# Patient Record
Sex: FEMALE | Race: WHITE | NOT HISPANIC OR LATINO | Employment: FULL TIME | URBAN - METROPOLITAN AREA
[De-identification: names, ages, dates, MRNs, and addresses within clinical notes are randomized per-mention and may not be internally consistent; named-entity substitution may affect disease eponyms.]

---

## 2017-01-16 ENCOUNTER — OFFICE VISIT (OUTPATIENT)
Dept: INTERNAL MEDICINE | Facility: CLINIC | Age: 47
End: 2017-01-16
Payer: COMMERCIAL

## 2017-01-16 VITALS
BODY MASS INDEX: 30.24 KG/M2 | HEIGHT: 70 IN | OXYGEN SATURATION: 99 % | HEART RATE: 75 BPM | TEMPERATURE: 97.8 F | DIASTOLIC BLOOD PRESSURE: 64 MMHG | WEIGHT: 211.2 LBS | SYSTOLIC BLOOD PRESSURE: 100 MMHG

## 2017-01-16 DIAGNOSIS — R10.84 ABDOMINAL PAIN, GENERALIZED: Primary | ICD-10-CM

## 2017-01-16 PROCEDURE — 99213 OFFICE O/P EST LOW 20 MIN: CPT | Performed by: INTERNAL MEDICINE

## 2017-01-16 RX ORDER — HYDROCODONE BITARTRATE AND ACETAMINOPHEN 7.5; 325 MG/1; MG/1
1 TABLET ORAL 3 TIMES DAILY
COMMUNITY
End: 2019-07-23

## 2017-01-16 RX ORDER — PREGABALIN 50 MG/1
150 CAPSULE ORAL 2 TIMES DAILY
COMMUNITY
Start: 2016-11-29 | End: 2017-09-21

## 2017-01-16 RX ORDER — DICYCLOMINE HCL 20 MG
20 TABLET ORAL 4 TIMES DAILY PRN
Qty: 40 TABLET | Refills: 1 | Status: SHIPPED | OUTPATIENT
Start: 2017-01-16 | End: 2017-04-20

## 2017-01-16 NOTE — NURSING NOTE
"Chief Complaint   Patient presents with     Abdominal Pain     Right side middle        Initial /64 mmHg  Pulse 75  Temp(Src) 97.8  F (36.6  C) (Oral)  Ht 5' 10\" (1.778 m)  Wt 211 lb 3.2 oz (95.8 kg)  BMI 30.30 kg/m2  SpO2 99% Estimated body mass index is 30.3 kg/(m^2) as calculated from the following:    Height as of this encounter: 5' 10\" (1.778 m).    Weight as of this encounter: 211 lb 3.2 oz (95.8 kg).  BP completed using cuff size: americo Escobar CMA    "

## 2017-01-16 NOTE — MR AVS SNAPSHOT
"              After Visit Summary   1/16/2017    Omaira Smith    MRN: [id number]           Patient Information     Date Of Birth          1970        Visit Information        Provider Department      1/16/2017 1:50 PM Autumn Fine MD [facility]        Today's Diagnoses     Abdominal pain, generalized    -  1       Care Instructions    Ideally, you should complete a CT scan to check for the possibility of appendicitis or another serious GI infection.  If the CT scan comes back as within normal limits, we would then advise you to treat your symptoms as a viral infectious gastroenteritis (or \"the stomach flu\"), keep the diet below and in addition to your usual pain medications, you may try taking   You have indicated that you would like to hold off on the CT scan due to finances. This is medically reasonable (though not optimal) and we will initiate treatment for the stomach flu, as noted above and further explained below.  In addition to your usual Norco, you can take an additional 2 grams of acetaminophen per day.  You may use Bentyl as needed for abdominal pain as well as per prescription bottle instructions.   You may continue to try to use heat or ice packs as needed.  return to clinic if you are not better in 2 weeks.  Please go to the Emergency room if you develop severe abdominal pain, a fever of 100.4F or above, or the inability to tolerate fluids, worsening nausea/vomiting or any blood in your urine or stool.      [place] Diet  Your healthcare provider may recommend a bland diet if you have an upset stomach. It consists of foods that are mild and easy to digest. It is better to eat small frequent meals rather than 3 large meals a day.    Beverages  OK: Fruit juices, non-caffeinated teas and coffee, non-carbonated oakley  Avoid: Carbonated beverage, caffeinated tea and coffee, all alcoholic beverages  Bread  OK: Refined white, wheat or rye bread, mario or soda crackers, " "[place] toast, plain rolls, bagels  Avoid: Whole-grain bread  Cereal  OK: Refined cereals: cooked or ready to eat  Avoid: Whole-grain cereals and granola, or those containing bran, seeds or nuts  Desserts  OK: Peanut butter and all others except those to \"avoid\"  Avoid: Chocolate, cocoa, coconut, popcorn, nuts, seeds, jam, marmalade  Fruits  OK: Canned, cooked, frozen or fresh fruits without seeds or tough skin  Avoid: Olives, skin and seeds of fruit  Meats  OK: All fresh or preserved meat, fish and fowl  Avoid: Any that are prepared with those spices to \"avoid\"  Cheese and eggs  OK: Eggs, cottage cheese, cream cheese, other cheeses  Avoid: All cheeses made with those spices to \"avoid\"  Potatoes and pasta  OK: Potato, rice, macaroni, noodles, spaghetti  Avoid: None  Soups  OK: All soups without heavy seasoning  Avoid: Soups made with those spices to \"avoid\"  Vegetables  OK: Canned, cooked, fresh or frozen mildly flavored vegetables without seeds, skins or coarse fiber  Avoid: Vegetables prepared with those spices to \"avoid\"; skin and seeds of vegetables and those with coarse fiber  Spices  OK: Salt, lemon and lime juice, vinegar, all extracts, tommy, cinnamon, thyme, mace, allspice, paprika  Avoid: Chili powder, cloves, pepper, seed spices, garlic, gravy pickles, highly seasoned salad dressings    7600-7137 [employer]. [address] [place] [address]. All rights reserved. This information is not intended as a substitute for professional medical care. Always follow your healthcare professional's instructions.              Follow-ups after your visit        Who to contact     If you have questions or need follow up information about today's clinic visit or your schedule please contact [facility] directly at [phone].  Normal or non-critical lab and imaging results will be communicated to you by MyChart, letter or phone within 4 business days after the clinic has received the " "results. If you do not hear from us within 7 days, please contact the clinic through [employer] or phone. If you have a critical or abnormal lab result, we will notify you by phone as soon as possible.  Submit refill requests through [employer] or call your pharmacy and they will forward the refill request to us. Please allow 3 business days for your refill to be completed.          Additional Information About Your Visit        [employer] Information     [employer] gives you secure access to your electronic health record. If you see a primary care provider, you can also send messages to your care team and make appointments. If you have questions, please call your primary care clinic.  If you do not have a primary care provider, please call [phone] and they will assist you.        Care EveryWhere ID     This is your Care EveryWhere ID. This could be used by other organizations to access your [facility] medical records  [id number]        Your Vitals Were     Pulse Temperature Height BMI (Body Mass Index) Pulse Oximetry       75 97.8  F (36.6  C) (Oral) 5' 10\" (1.778 m) 30.30 kg/m2 99%        Blood Pressure from Last 3 Encounters:   01/16/17 100/64   07/13/16 139/84   03/09/16 132/84    Weight from Last 3 Encounters:   01/16/17 211 lb 3.2 oz (95.8 kg)   07/13/16 216 lb 9.6 oz (98.249 kg)   03/09/16 208 lb (94.348 kg)              Today, you had the following     No orders found for display         Today's Medication Changes          These changes are accurate as of: 1/16/17  3:06 PM.  If you have any questions, ask your nurse or doctor.               Start taking these medicines.        Dose/Directions    dicyclomine 20 MG tablet   Commonly known as:  BENTYL   Used for:  Abdominal pain, generalized   Started by:  Autumn Fine MD        Dose:  20 mg   Take 1 tablet (20 mg) by mouth 4 times daily as needed   Quantity:  40 tablet   Refills:  1         These medicines have changed or have updated prescriptions.  "       Dose/Directions    HYDROcodone-acetaminophen 7.5-325 MG per tablet   Commonly known as:  NORCO   This may have changed:  Another medication with the same name was removed. Continue taking this medication, and follow the directions you see here.   Changed by:  Autumn Fine MD        Dose:  1 tablet   Take 1 tablet by mouth 3 times daily   Refills:  0            Where to get your medicines      These medications were sent to [id number] - [place] - [facility] [address] AT [address] [place], [place] [address]     Phone:  [phone]    - dicyclomine 20 MG tablet             Primary Care Provider Office Phone # Fax #    Jt Hollis PA-C [phone]       [facility] [address] [facility] [address]        Thank you!     Thank you for choosing [facility]  for your care. Our goal is always to provide you with excellent care. Hearing back from our patients is one way we can continue to improve our services. Please take a few minutes to complete the written survey that you may receive in the mail after your visit with us. Thank you!             Your Updated Medication List - Protect others around you: Learn how to safely use, store and throw away your medicines at www.disposemymeds.org.          This list is accurate as of: 1/16/17  3:06 PM.  Always use your most recent med list.                   Brand Name Dispense Instructions for use    dicyclomine 20 MG tablet    BENTYL    40 tablet    Take 1 tablet (20 mg) by mouth 4 times daily as needed       HYDROcodone-acetaminophen 7.5-325 MG per tablet    NORCO     Take 1 tablet by mouth 3 times daily       levonorgestrel-ethinyl estradiol 0.1-20 MG-MCG per tablet    AVIANE,ALESSE,LESSINA    84 tablet    Take 1 tablet by mouth daily       LYRICA 50 MG capsule   Generic drug:  pregabalin      150 mg 2 times daily       PAXIL 10 MG tablet    Generic drug:  PARoxetine      Take 10 mg by mouth every morning.       TYLENOL PO      Take  by mouth 3 times daily.

## 2017-01-16 NOTE — PATIENT INSTRUCTIONS
"Ideally, you should complete a CT scan to check for the possibility of appendicitis or another serious GI infection.  If the CT scan comes back as within normal limits, we would then advise you to treat your symptoms as a viral infectious gastroenteritis (or \"the stomach flu\"), keep the diet below and in addition to your usual pain medications, you may try taking   You have indicated that you would like to hold off on the CT scan due to finances. This is medically reasonable (though not optimal) and we will initiate treatment for the stomach flu, as noted above and further explained below.  In addition to your usual Norco, you can take an additional 2 grams of acetaminophen per day.  You may use Bentyl as needed for abdominal pain as well as per prescription bottle instructions.   You may continue to try to use heat or ice packs as needed.  return to clinic if you are not better in 2 weeks.  Please go to the Emergency room if you develop severe abdominal pain, a fever of 100.4F or above, or the inability to tolerate fluids, worsening nausea/vomiting or any blood in your urine or stool.      [place] Diet  Your healthcare provider may recommend a bland diet if you have an upset stomach. It consists of foods that are mild and easy to digest. It is better to eat small frequent meals rather than 3 large meals a day.    Beverages  OK: Fruit juices, non-caffeinated teas and coffee, non-carbonated oakley  Avoid: Carbonated beverage, caffeinated tea and coffee, all alcoholic beverages  Bread  OK: Refined white, wheat or rye bread, mario or soda crackers, [place] toast, plain rolls, bagels  Avoid: Whole-grain bread  Cereal  OK: Refined cereals: cooked or ready to eat  Avoid: Whole-grain cereals and granola, or those containing bran, seeds or nuts  Desserts  OK: Peanut butter and all others except those to \"avoid\"  Avoid: Chocolate, cocoa, coconut, popcorn, nuts, seeds, jam, marmalade  Fruits  OK: Canned, cooked, frozen or fresh " "fruits without seeds or tough skin  Avoid: Olives, skin and seeds of fruit  Meats  OK: All fresh or preserved meat, fish and fowl  Avoid: Any that are prepared with those spices to \"avoid\"  Cheese and eggs  OK: Eggs, cottage cheese, cream cheese, other cheeses  Avoid: All cheeses made with those spices to \"avoid\"  Potatoes and pasta  OK: Potato, rice, macaroni, noodles, spaghetti  Avoid: None  Soups  OK: All soups without heavy seasoning  Avoid: Soups made with those spices to \"avoid\"  Vegetables  OK: Canned, cooked, fresh or frozen mildly flavored vegetables without seeds, skins or coarse fiber  Avoid: Vegetables prepared with those spices to \"avoid\"; skin and seeds of vegetables and those with coarse fiber  Spices  OK: Salt, lemon and lime juice, vinegar, all extracts, tommy, cinnamon, thyme, mace, allspice, paprika  Avoid: Chili powder, cloves, pepper, seed spices, garlic, gravy pickles, highly seasoned salad dressings    9442-1762 [employer]. [address] [place] [address]. All rights reserved. This information is not intended as a substitute for professional medical care. Always follow your healthcare professional's instructions.        "

## 2017-01-16 NOTE — PROGRESS NOTES
SUBJECTIVE:                                                    Omaira Smith is a 46 year old female who presents to clinic today for the following health issues:      Abdominal Pain      Duration: 3 days     Description (location/character/radiation): started on left side moved to right. Sharp pain comes and goes. Constant dull pain stays       Associated flank pain: starting to move around to back area    Intensity:  severe    Accompanying signs and symptoms:        Fever/Chills: no        Gas/Bloating: YES  Heartburn: no       Nausea/vomiting: YES to nausea.         Diarrhea: YES. In the last 24h, she has had diarrhea a few times (earlier in the course she had it 10 times a day).       Dysuria or Hematuria: no     History (previous similar pain/trauma/previous testing): none    Precipitating or alleviating factors:       Pain worse with eating/BM/urination: eating seems to make it worse, or the pain is the same from food, but the nausea comes on.       Pain relieved by BM: no     Therapies tried and outcome: heat-without an affect.     LMP:  1/6/2016    No recent travel or sick contacts and no recent abx. No new medications recently.     Patient had diarrhea for most of Saturday and then got a sharp pain on the left side, lasted an hour and subsided; then pain started on the right side of the abd and has continued since.  She is on chronic opioids, but no changes in how she takes it (thus not likely to be opioid withdrawal).   Prior to the start of these symptoms, she had BMs twice a day. The color of the stools has been the same.  Food intake yesterday: soda crackers and chicken broth soup yesterday and this brought back the pain. Eating toast today, made the pain worse and made her nauseous and she may have had diarrhea afterwards.   She has a history of cholecystectomy 10 yrs ago.   Fluids: yesterday: about 48 oz of fluids.  Has never had a colonoscopy.  No recent abd CTs.      Problem list and histories  "reviewed & adjusted, as indicated.  Additional history: as documented    Patient Active Problem List   Diagnosis     Migraines     RSD (reflex sympathetic dystrophy)     CARDIOVASCULAR SCREENING; LDL GOAL LESS THAN 160     Ankle pain     Cervical high risk HPV (human papillomavirus) test positive     JESUS (generalized anxiety disorder)     Panic attack     Past Surgical History   Procedure Laterality Date     Orthopedic surgery       Lt leg 5X     Cholecystectomy       Eye surgery  age 5     Rt lazy eye repair     Eye surgery  age 15     Rt eye     Ent surgery  age 15     tonsilectomy       Social History   Substance Use Topics     Smoking status: Former Smoker     Smokeless tobacco: Never Used     Alcohol Use: Yes      Comment: occ     Family History   Problem Relation Age of Onset     Family history unknown: Yes         Current Outpatient Prescriptions   Medication Sig Dispense Refill     HYDROcodone-acetaminophen (NORCO) 7.5-325 MG per tablet Take 1 tablet by mouth 3 times daily       pregabalin (LYRICA) 50 MG capsule 150 mg 2 times daily        dicyclomine (BENTYL) 20 MG tablet Take 1 tablet (20 mg) by mouth 4 times daily as needed 40 tablet 1     levonorgestrel-ethinyl estradiol (AVIANE,ALESSE,LESSINA) 0.1-20 MG-MCG per tablet Take 1 tablet by mouth daily 84 tablet 4     Acetaminophen (TYLENOL PO) Take  by mouth 3 times daily.       paroxetine (PAXIL) 10 MG tablet Take 10 mg by mouth every morning.       ==============================================================  ROS:  Constitutional, HEENT, cardiovascular, pulmonary, GI, , musculoskeletal, neuro, skin, endocrine and psych systems are negative, except as otherwise noted.       OBJECTIVE:                                                    /64 mmHg  Pulse 75  Temp(Src) 97.8  F (36.6  C) (Oral)  Ht 5' 10\" (1.778 m)  Wt 211 lb 3.2 oz (95.8 kg)  BMI 30.30 kg/m2  SpO2 99%  Body mass index is 30.3 kg/(m^2).     GENERAL APPEARANCE: healthy, alert and " "in no distress  EYES: Eyes grossly normal to inspection, and conjunctivae and sclerae normal  HENT: head normocephalic and atraumatic and mouth without ulcers or lesions, oropharynx clear and oral mucous membranes moist  NECK: no noticeable adenopathy, no asymmetry, masses, or scars   RESP: lungs clear to auscultation - no rales, rhonchi or wheezes  CV: regular rate and rhythm, normal S1 S2, no S3 or S4, no murmur, click or rub, no peripheral edema and peripheral pulses strong  ABDOMEN:  Generalized tenderness to palpation without peritoneal signs  O/w, soft, nontender, no hepatosplenomegaly, no masses and bowel sounds normal  MS: no musculoskeletal defects are noted and gait is age appropriate without ataxia  SKIN: no suspicious lesions or rashes  NEURO: mentation intact and speech normal  PSYCH: mentation appears normal and affect normal/bright.     ASSESSMENT/PLAN:                                                        ICD-10-CM    1. Abdominal pain, generalized R10.84 dicyclomine (BENTYL) 20 MG tablet      ASSESSMENT: most c/w viral infectious gastroenteritis.  PLAN:  As per orders above and patient instructions below.      Patient Instructions   Ideally, you should complete a CT scan to check for the possibility of appendicitis or another serious GI infection.  If the CT scan comes back as within normal limits, we would then advise you to treat your symptoms as a viral infectious gastroenteritis (or \"the stomach flu\"), keep the diet below and in addition to your usual pain medications, you may try taking   You have indicated that you would like to hold off on the CT scan due to finances. This is medically reasonable (though not optimal) and we will initiate treatment for the stomach flu, as noted above and further explained below.  In addition to your usual Norco, you can take an additional 2 grams of acetaminophen per day.  You may use Bentyl as needed for abdominal pain as well as per prescription bottle " "instructions.   You may continue to try to use heat or ice packs as needed.  return to clinic if you are not better in 2 weeks.  Please go to the Emergency room if you develop severe abdominal pain, a fever of 100.4F or above, or the inability to tolerate fluids, worsening nausea/vomiting or any blood in your urine or stool.      [place] Diet  Your healthcare provider may recommend a bland diet if you have an upset stomach. It consists of foods that are mild and easy to digest. It is better to eat small frequent meals rather than 3 large meals a day.    Beverages  OK: Fruit juices, non-caffeinated teas and coffee, non-carbonated oakley  Avoid: Carbonated beverage, caffeinated tea and coffee, all alcoholic beverages  Bread  OK: Refined white, wheat or rye bread, mario or soda crackers, [place] toast, plain rolls, bagels  Avoid: Whole-grain bread  Cereal  OK: Refined cereals: cooked or ready to eat  Avoid: Whole-grain cereals and granola, or those containing bran, seeds or nuts  Desserts  OK: Peanut butter and all others except those to \"avoid\"  Avoid: Chocolate, cocoa, coconut, popcorn, nuts, seeds, jam, marmalade  Fruits  OK: Canned, cooked, frozen or fresh fruits without seeds or tough skin  Avoid: Olives, skin and seeds of fruit  Meats  OK: All fresh or preserved meat, fish and fowl  Avoid: Any that are prepared with those spices to \"avoid\"  Cheese and eggs  OK: Eggs, cottage cheese, cream cheese, other cheeses  Avoid: All cheeses made with those spices to \"avoid\"  Potatoes and pasta  OK: Potato, rice, macaroni, noodles, spaghetti  Avoid: None  Soups  OK: All soups without heavy seasoning  Avoid: Soups made with those spices to \"avoid\"  Vegetables  OK: Canned, cooked, fresh or frozen mildly flavored vegetables without seeds, skins or coarse fiber  Avoid: Vegetables prepared with those spices to \"avoid\"; skin and seeds of vegetables and those with coarse fiber  Spices  OK: Salt, lemon and lime juice, vinegar, all " extracts, tommy, cinnamon, thyme, mace, allspice, paprika  Avoid: Chili powder, cloves, pepper, seed spices, garlic, gravy pickles, highly seasoned salad dressings    [id number] [employer]. [address] [place] [address]. All rights reserved. This information is not intended as a substitute for professional medical care. Always follow your healthcare professional's instructions.                          Autumn Fine MD  [facility]

## 2017-04-20 ENCOUNTER — OFFICE VISIT (OUTPATIENT)
Dept: FAMILY MEDICINE | Facility: CLINIC | Age: 47
End: 2017-04-20
Payer: OTHER MISCELLANEOUS

## 2017-04-20 VITALS
OXYGEN SATURATION: 98 % | HEIGHT: 70 IN | DIASTOLIC BLOOD PRESSURE: 75 MMHG | WEIGHT: 216 LBS | SYSTOLIC BLOOD PRESSURE: 125 MMHG | BODY MASS INDEX: 30.92 KG/M2 | HEART RATE: 81 BPM

## 2017-04-20 DIAGNOSIS — G90.50 RSD (REFLEX SYMPATHETIC DYSTROPHY): ICD-10-CM

## 2017-04-20 DIAGNOSIS — Z01.818 PREOP GENERAL PHYSICAL EXAM: Primary | ICD-10-CM

## 2017-04-20 LAB
BASOPHILS # BLD AUTO: 0.1 10E9/L (ref 0–0.2)
BASOPHILS NFR BLD AUTO: 0.6 %
DIFFERENTIAL METHOD BLD: NORMAL
EOSINOPHIL # BLD AUTO: 0.4 10E9/L (ref 0–0.7)
EOSINOPHIL NFR BLD AUTO: 4.8 %
ERYTHROCYTE [DISTWIDTH] IN BLOOD BY AUTOMATED COUNT: 12.9 % (ref 10–15)
HCT VFR BLD AUTO: 38.4 % (ref 35–47)
HGB BLD-MCNC: 12.7 G/DL (ref 11.7–15.7)
LYMPHOCYTES # BLD AUTO: 3.8 10E9/L (ref 0.8–5.3)
LYMPHOCYTES NFR BLD AUTO: 43.1 %
MCH RBC QN AUTO: 30.2 PG (ref 26.5–33)
MCHC RBC AUTO-ENTMCNC: 33.1 G/DL (ref 31.5–36.5)
MCV RBC AUTO: 91 FL (ref 78–100)
MONOCYTES # BLD AUTO: 0.6 10E9/L (ref 0–1.3)
MONOCYTES NFR BLD AUTO: 6.4 %
NEUTROPHILS # BLD AUTO: 4 10E9/L (ref 1.6–8.3)
NEUTROPHILS NFR BLD AUTO: 45.1 %
PLATELET # BLD AUTO: 216 10E9/L (ref 150–450)
RBC # BLD AUTO: 4.21 10E12/L (ref 3.8–5.2)
WBC # BLD AUTO: 8.8 10E9/L (ref 4–11)

## 2017-04-20 PROCEDURE — 85025 COMPLETE CBC W/AUTO DIFF WBC: CPT | Performed by: PHYSICIAN ASSISTANT

## 2017-04-20 PROCEDURE — 80048 BASIC METABOLIC PNL TOTAL CA: CPT | Performed by: PHYSICIAN ASSISTANT

## 2017-04-20 PROCEDURE — 99214 OFFICE O/P EST MOD 30 MIN: CPT | Performed by: PHYSICIAN ASSISTANT

## 2017-04-20 PROCEDURE — 36415 COLL VENOUS BLD VENIPUNCTURE: CPT | Performed by: PHYSICIAN ASSISTANT

## 2017-04-20 NOTE — PROGRESS NOTES
[facility]  [address] [facility] [address]  [phone]  Dept: [phone]    PRE-OP EVALUATION:  Today's date: 2017    Omaira Smith (: 1970) presents for pre-operative evaluation assessment as requested by Dr. Baker.  She requires evaluation and anesthesia risk assessment prior to undergoing surgery/procedure for treatment of RSD .  Proposed procedure: SCS implant    Date of Surgery/ Procedure: 17  Time of Surgery/ Procedure: 7:00 am   Hospital/Surgical Facility: [facility]  Fax number for surgical facility: [phone]  Primary Physician: Jt Hollis  Type of Anesthesia Anticipated: General    Patient has a Health Care Directive or Living Will:  YES    Preop Questions 2017   1.  Do you have a history of heart attack, stroke, stent, bypass or surgery on an artery in the head, neck, heart or legs? No   2.  Do you ever have any pain or discomfort in your chest? No   3.  Do you have a history of  Heart Failure? No   4.   Are you troubled by shortness of breath when:  walking on a level surface, or up a slight hill, or at night? No   5.  Do you currently have a cold, bronchitis or other respiratory infection? No   6.  Do you have a cough, shortness of breath, or wheezing? No   7.  Do you sometimes get pains in the calves of your legs when you walk? No   8. Do you or anyone in your family have previous history of blood clots? No   9.  Do you or does anyone in your family have a serious bleeding problem such as prolonged bleeding following surgeries or cuts? No   10. Have you ever had problems with anemia or been told to take iron pills? No   11. Have you had any abnormal blood loss such as black, tarry or bloody stools, or abnormal vaginal bleeding? No   12. Have you ever had a blood transfusion? No   13. Have you or any of your relatives ever had problems with anesthesia? No   14. Do you have sleep apnea, excessive snoring or daytime drowsiness? No    15. Do you have any prosthetic heart valves? No   16. Do you have prosthetic joints? No   17. Is there any chance that you may be pregnant? No         HPI:                                                      Brief HPI related to upcoming procedure: 14 yrs ago tib/fib fracture with multiple surgeries. History of RDS      See problem list for active medical problems.  Problems all longstanding and stable, except as noted/documented.  See ROS for pertinent symptoms related to these conditions.                                                                                                  .    MEDICAL HISTORY:                                                      Patient Active Problem List    Diagnosis Date Noted     JESUS (generalized anxiety disorder) 12/07/2016     Priority: Medium     Panic attack 12/07/2016     Priority: Medium     Cervical high risk HPV (human papillomavirus) test positive 03/06/2015     Priority: Medium     3/6/15: Pap - NIL, + HR HPV 18. Plan colp  4/28/15: [place] - normal with no visible lesions. No biopsies taken. Plan cotest in 1 year.   3/9/16: pap - NIL, + HR HPV 18. Plan colp  7/13/16 [place]: Your recent pathology report was negative for abnormal cells so no treatment is advised at this time. Plan: cotest in 1 year.        Ankle pain 06/11/2014     Priority: Medium     CARDIOVASCULAR SCREENING; LDL GOAL LESS THAN 160 07/11/2012     Priority: Medium     Migraines      Priority: Medium     RSD (reflex sympathetic dystrophy)      Priority: Medium      Past Medical History:   Diagnosis Date     Anxiety      Cervical high risk HPV (human papillomavirus) test positive 3/2015, 3/2016    type 18     Migraines      RSD (reflex sympathetic dystrophy)      Past Surgical History:   Procedure Laterality Date     CHOLECYSTECTOMY       ENT SURGERY  age 15    tonsilectomy     EYE SURGERY  age 5    Rt lazy eye repair     EYE SURGERY  age 15    Rt eye     ORTHOPEDIC SURGERY      Lt leg 5X     Current  "Outpatient Prescriptions   Medication Sig Dispense Refill     HYDROcodone-acetaminophen (NORCO) 7.5-325 MG per tablet Take 1 tablet by mouth 3 times daily       pregabalin (LYRICA) 50 MG capsule 150 mg 2 times daily        levonorgestrel-ethinyl estradiol (AVIANE,ALESSE,LESSINA) 0.1-20 MG-MCG per tablet Take 1 tablet by mouth daily 84 tablet 4     Acetaminophen (TYLENOL PO) Take  by mouth 3 times daily.       paroxetine (PAXIL) 10 MG tablet Take 10 mg by mouth every morning.       OTC products: None, except as noted above    Allergies   Allergen Reactions     Gabapentin Other (See Comments)     Irritability     Ibuprofen Other (See Comments)     LW Reaction: stomach pain      Latex Allergy: NO    Social History   Substance Use Topics     Smoking status: Former Smoker     Smokeless tobacco: Never Used     Alcohol use Yes      Comment: occ     History   Drug Use No       REVIEW OF SYSTEMS:                                                    C: NEGATIVE for fever, chills, change in weight  I: NEGATIVE for worrisome rashes, moles or lesions  E: NEGATIVE for vision changes or irritation  E/M: NEGATIVE for ear, mouth and throat problems  R: NEGATIVE for significant cough or SOB  B: NEGATIVE for masses, tenderness or discharge  CV: NEGATIVE for chest pain, palpitations or peripheral edema  GI: NEGATIVE for nausea, abdominal pain, heartburn, or change in bowel habits  : NEGATIVE for frequency, dysuria, or hematuria  M: NEGATIVE for significant arthralgias or myalgia  N: NEGATIVE for weakness, dizziness or paresthesias  E: NEGATIVE for temperature intolerance, skin/hair changes  H: NEGATIVE for bleeding problems  P: NEGATIVE for changes in mood or affect    EXAM:                                                    /75  Pulse 81  Ht 5' 10\" (1.778 m)  Wt 216 lb (98 kg)  SpO2 98%  BMI 30.99 kg/m2    GENERAL APPEARANCE: healthy, alert and no distress     EYES: EOMI, PERRL     HENT: ear canals and TM's normal and nose " and mouth without ulcers or lesions     NECK: no adenopathy, no asymmetry, masses, or scars and thyroid normal to palpation     RESP: lungs clear to auscultation - no rales, rhonchi or wheezes     CV: regular rates and rhythm, normal S1 S2, no S3 or S4 and no murmur, click or rub     ABDOMEN:  soft, nontender, no HSM or masses and bowel sounds normal     PSYCH: mentation appears normal. and affect normal/bright     LYMPHATICS: No axillary, cervical, or supraclavicular nodes    DIAGNOSTICS:                                                      Labs Drawn and in Process:   Unresulted Labs Ordered in the Past 30 Days of this Admission     No orders found from 2/19/2017 to 4/21/2017.          Recent Labs   Lab Test  02/27/15   0721  06/11/14   1736  06/20/12   1722   HGB  13.3  12.9  12.3   PLT  225   --   232   NA  140  138  141   POTASSIUM  4.5  4.2  4.0   CR  0.70  0.72  0.57        IMPRESSION:                                                    Reason for surgery/procedure:  treatment of RSD .  Proposed procedure: SCS implant      The proposed surgical procedure is considered INTERMEDIATE risk.    REVISED CARDIAC RISK INDEX  The patient has the following serious cardiovascular risks for perioperative complications such as (MI, PE, VFib and 3  AV Block):  No serious cardiac risks  INTERPRETATION: 0 risks: Class I (very low risk - 0.4% complication rate)    The patient has the following additional risks for perioperative complications:  No identified additional risks      ICD-10-CM    1. Preop general physical exam Z01.818 Basic metabolic panel     CBC with platelets differential   2. RSD (reflex sympathetic dystrophy) G90.50        RECOMMENDATIONS:                                                        --Patient is to take all scheduled medications on the day of surgery EXCEPT for modifications listed below.    APPROVAL GIVEN to proceed with proposed procedure, without further diagnostic evaluation       Signed  Electronically by: Jt Hollis PA-C  Chart reviewed, agree with evaluation and recommendations above.  Daksha Gómez M.D.       Copy of this evaluation report is provided to requesting physician.    [place] Preop Guidelines

## 2017-04-20 NOTE — MR AVS SNAPSHOT
After Visit Summary   4/20/2017    Omaira Smith    MRN: [id number]           Patient Information     Date Of Birth          1970        Visit Information        Provider Department      4/20/2017 5:10 PM Jt Hollis PA-C [facility]        Today's Diagnoses     Preop general physical exam    -  1    RSD (reflex sympathetic dystrophy)          Care Instructions      Before Your Surgery      Call your surgeon if there is any change in your health. This includes signs of a cold or flu (such as a sore throat, runny nose, cough, rash or fever).    Do not smoke, drink alcohol or take over the counter medicine (unless your surgeon or primary care doctor tells you to) for the 24 hours before and after surgery.    If you take prescribed drugs: Follow your doctor s orders about which medicines to take and which to stop until after surgery.    Eating and drinking prior to surgery: follow the instructions from your surgeon    Take a shower or bath the night before surgery. Use the soap your surgeon gave you to gently clean your skin. If you do not have soap from your surgeon, use your regular soap. Do not shave or scrub the surgery site.  Wear clean pajamas and have clean sheets on your bed.         Follow-ups after your visit        Who to contact     If you have questions or need follow up information about today's clinic visit or your schedule please contact [facility] directly at [phone].  Normal or non-critical lab and imaging results will be communicated to you by MyChart, letter or phone within 4 business days after the clinic has received the results. If you do not hear from us within 7 days, please contact the clinic through MyChart or phone. If you have a critical or abnormal lab result, we will notify you by phone as soon as possible.  Submit refill requests through [employer] [address] or call your pharmacy and they will forward the refill request to us. Please  "allow 3 business days for your refill to be completed.          Additional Information About Your Visit        MyChart Information     [employer] gives you secure access to your electronic health record. If you see a primary care provider, you can also send messages to your care team and make appointments. If you have questions, please call your primary care clinic.  If you do not have a primary care provider, please call [phone] and they will assist you.        Care EveryWhere ID     This is your Care EveryWhere ID. This could be used by other organizations to access your [facility] medical records  [id number]        Your Vitals Were     Pulse Height Pulse Oximetry BMI (Body Mass Index)          81 5' 10\" (1.778 m) 98% 30.99 kg/m2         Blood Pressure from Last 3 Encounters:   04/20/17 125/75   01/16/17 100/64   07/13/16 139/84    Weight from Last 3 Encounters:   04/20/17 216 lb (98 kg)   01/16/17 211 lb 3.2 oz (95.8 kg)   07/13/16 216 lb 9.6 oz (98.2 kg)              We Performed the Following     Basic metabolic panel     CBC with platelets differential        Primary Care Provider Office Phone # Fax #    Jt Hollis PA-C [phone]       [facility] [address] [facility] [address]        Thank you!     Thank you for choosing [facility]  for your care. Our goal is always to provide you with excellent care. Hearing back from our patients is one way we can continue to improve our services. Please take a few minutes to complete the written survey that you may receive in the mail after your visit with us. Thank you!             Your Updated Medication List - Protect others around you: Learn how to safely use, store and throw away your medicines at www.disposemymeds.org.          This list is accurate as of: 4/20/17  5:35 PM.  Always use your most recent med list.                   Brand Name Dispense Instructions for use    HYDROcodone-acetaminophen 7.5-325 " MG per tablet    NORCO     Take 1 tablet by mouth 3 times daily       levonorgestrel-ethinyl estradiol 0.1-20 MG-MCG per tablet    AVIANE,ALESSE,LESSINA    84 tablet    Take 1 tablet by mouth daily       LYRICA 50 MG capsule   Generic drug:  pregabalin      150 mg 2 times daily       PAXIL 10 MG tablet   Generic drug:  PARoxetine      Take 10 mg by mouth every morning.       TYLENOL PO      Take  by mouth 3 times daily.

## 2017-04-20 NOTE — NURSING NOTE
"Chief Complaint   Patient presents with     Pre-Op Exam       Initial /75  Pulse 81  Ht 5' 10\" (1.778 m)  Wt 216 lb (98 kg)  SpO2 98%  BMI 30.99 kg/m2 Estimated body mass index is 30.99 kg/(m^2) as calculated from the following:    Height as of this encounter: 5' 10\" (1.778 m).    Weight as of this encounter: 216 lb (98 kg).  Medication Reconciliation: complete  Rochelle Acevedo CMA    "

## 2017-04-21 LAB
ANION GAP SERPL CALCULATED.3IONS-SCNC: 6 MMOL/L (ref 3–14)
BUN SERPL-MCNC: 14 MG/DL (ref 7–30)
CALCIUM SERPL-MCNC: 9 MG/DL (ref 8.5–10.1)
CHLORIDE SERPL-SCNC: 104 MMOL/L (ref 94–109)
CO2 SERPL-SCNC: 29 MMOL/L (ref 20–32)
CREAT SERPL-MCNC: 0.68 MG/DL (ref 0.52–1.04)
GFR SERPL CREATININE-BSD FRML MDRD: ABNORMAL ML/MIN/1.7M2
GLUCOSE SERPL-MCNC: 102 MG/DL (ref 70–99)
POTASSIUM SERPL-SCNC: 4.2 MMOL/L (ref 3.4–5.3)
SODIUM SERPL-SCNC: 139 MMOL/L (ref 133–144)

## 2017-04-21 NOTE — PROGRESS NOTES
Ms. Smith,    All of your labs were normal for you.    Please contact the clinic if you have additional questions.  Thank you.    Sincerely,    Jt Hollis PA-C

## 2017-04-25 ENCOUNTER — TELEPHONE (OUTPATIENT)
Dept: FAMILY MEDICINE | Facility: CLINIC | Age: 47
End: 2017-04-25

## 2017-06-02 DIAGNOSIS — Z30.011 ENCOUNTER FOR INITIAL PRESCRIPTION OF CONTRACEPTIVE PILLS: ICD-10-CM

## 2017-06-02 RX ORDER — LEVONORGESTREL/ETHIN.ESTRADIOL 0.1-0.02MG
TABLET ORAL
Qty: 84 TABLET | Refills: 0 | Status: SHIPPED | OUTPATIENT
Start: 2017-06-02 | End: 2017-08-25

## 2017-06-02 NOTE — TELEPHONE ENCOUNTER
Rx refilled per [facility] RN refill protocol.    TC, patient due for:  AFE and BCP refill     Yasmeen Gale RN

## 2017-06-20 ENCOUNTER — MYC MEDICAL ADVICE (OUTPATIENT)
Dept: FAMILY MEDICINE | Facility: CLINIC | Age: 47
End: 2017-06-20

## 2017-06-20 DIAGNOSIS — F41.1 GAD (GENERALIZED ANXIETY DISORDER): Primary | ICD-10-CM

## 2017-06-21 RX ORDER — PAROXETINE 10 MG/1
10 TABLET, FILM COATED ORAL AT BEDTIME
Qty: 90 TABLET | Refills: 1 | Status: SHIPPED | OUTPATIENT
Start: 2017-06-21 | End: 2017-10-24

## 2017-06-22 ENCOUNTER — OFFICE VISIT (OUTPATIENT)
Dept: FAMILY MEDICINE | Facility: CLINIC | Age: 47
End: 2017-06-22
Payer: OTHER MISCELLANEOUS

## 2017-06-22 VITALS
WEIGHT: 220 LBS | DIASTOLIC BLOOD PRESSURE: 81 MMHG | HEIGHT: 70 IN | BODY MASS INDEX: 31.5 KG/M2 | OXYGEN SATURATION: 100 % | HEART RATE: 82 BPM | SYSTOLIC BLOOD PRESSURE: 128 MMHG

## 2017-06-22 DIAGNOSIS — G90.50 RSD (REFLEX SYMPATHETIC DYSTROPHY): ICD-10-CM

## 2017-06-22 DIAGNOSIS — M72.2 PLANTAR FASCIITIS: Primary | ICD-10-CM

## 2017-06-22 PROCEDURE — 99213 OFFICE O/P EST LOW 20 MIN: CPT | Performed by: PHYSICIAN ASSISTANT

## 2017-06-22 NOTE — MR AVS SNAPSHOT
After Visit Summary   6/22/2017    Omaira Smith    MRN: [id number]           Patient Information     Date Of Birth          1970        Visit Information        Provider Department      6/22/2017 5:10 PM Jt Hollis PA-C [facility]        Today's Diagnoses     Plantar fasciitis    -  1    RSD (reflex sympathetic dystrophy)          Care Instructions                           Plantar Fasciitis   What is plantar fasciitis?   Plantar fasciitis is a painful inflammation of the bottom of the foot between the ball of the foot and the heel.   How does it occur?   There are several possible causes of plantar fasciitis, including:     wearing high heels     gaining weight     increased walking, standing, or stair-climbing   If you wear high-heeled shoes, including western-style boots, for long periods of time, the tough, tendonlike tissue of the bottom of your foot can become shorter. This layer of tissue is called fascia. Pain occurs when you stretch fascia that has shortened. This painful stretching might happen, for example, when you walk barefoot after getting out of bed in the morning.   If you gain weight, you might be more likely to have plantar fasciitis, especially if you walk a lot or  shoes with poor heel cushioning. Normally there is a pad of fatty tissue under your heel bone. Weight gain might break down this fat pad and cause heel pain.   Runners may get plantar fasciitis when they change their workout and increase their mileage or frequency of workouts. It can also occur with a change in exercise surface or terrain, or if your shoes are worn out and don't provide enough cushion for your heels.   If the arches of your foot are abnormally high or low, you are more likely to develop plantar fasciitis than if your arches are normal.   What are the symptoms?   The main symptom of plantar fasciitis is heel pain when you walk. You may also feel pain when you  stand and possibly even when you are resting. This pain typically occurs first thing in the morning after you get out of bed, when your foot is placed flat on the floor. The pain occurs because you are stretching the plantar fascia. The pain usually lessens with more walking, but you may have it again after periods of rest.   You may feel no pain when you are sleeping because the position of your feet during rest allows the fascia to shorten and relax.   How is it diagnosed?   Your healthcare provider will ask about your symptoms. He or she will ask if the bottom of your heel is tender and if you have pain when you stretch the bottom of your foot. An X-ray of your heel may be done.   How is it treated?     Give your painful heel lots of rest. You may need to stay completely off your foot for several days when the pain is severe.     Your healthcare provider may recommend or prescribe anti-inflammatory medicines, such as aspirin or ibuprofen. These drugs decrease pain and inflammation. Adults aged 65 years and older should not take non-steroidal anti-inflammatory medicine for more than 7 days without their healthcare provider's approval. Resting your heel on an ice pack for a few minutes several times a day can also help.     Try to cushion your foot. You can do this by wearing athletic shoes, even at work, for awhile. Heel cushions can also be used. The cushions should be worn in both shoes. They are most helpful if you are overweight or an older adult.     Your provider may recommend special arch supports or inserts for your shoes called orthotics, either custom-made or off the shelf. These supports can be particularly helpful if you have flat feet or high arches.     Your provider may recommend an injection of a cortisone-like medicine.     Lose weight if needed.     A night splint may be recommended. This will keep the plantar fascia stretched while you are sleeping.     Physical therapy for additional treatments  may be recommended     Surgery is rarely needed.   How long will the effects last?   You may find that the pain is sometimes worse and sometimes better over time. If you get treatment soon after you notice the pain, the symptoms should stop after several weeks. If, however, you have had plantar fasciitis for a long time, it may take many weeks to months for the pain to go away.   When can I return to my normal activities?   Everyone recovers from an injury at a different rate. Return to your activities will be determined by how soon your foot recovers, not by how many days or weeks it has been since your injury has occurred. In general, the longer you have symptoms before you start treatment, the longer it will take to get better. The goal of rehabilitation is to return you to your normal activities as soon as is safely possible. If you return too soon you may worsen your injury.   You may safely return to your activities when, starting from the top of the list and progressing to the end, each of the following is true:     You have full range of motion in the injured foot compared with the uninjured foot.     You have full strength of the injured foot compared with the uninjured foot.     You can walk straight ahead without significant pain or limping.   How can I prevent plantar fasciitis?   The best way to prevent plantar fasciitis is to wear shoes that are well made and fit your feet. This is especially important when you exercise or walk a lot or stand for a long time on hard surfaces. Get new athletic shoes before your old shoes stop supporting and cushioning your feet.   You should also:     Avoid repeated jarring to the heel.     Keep a healthy weight.     Do your leg and foot stretching exercises regularly.   Developed by [employer].   Published by [employer].   Last modified: 2008-08-11   Last reviewed: 2008-07-07   This content is reviewed periodically and is subject to change as new health information  becomes available. The information is intended to inform and educate and is not a replacement for medical evaluation, advice, diagnosis or treatment by a healthcare professional.   Sports Medicine Advisor 2009.1 Index  Sports Medicine Advisor 2009.1 Credits     2009 [facility] and/or its affiliates. All Rights Reserved.               Plantar Fasciitis Rehabilitation Exercises   You may begin exercising the muscles of your foot right away by gently stretching them as follows:     Prone hip extension: Lie on your stomach with your legs straight out behind you. Tighten the buttocks and thigh muscles of your injured leg and lift it off the floor about 8 inches. Keep your knee straight. Hold for 5 seconds. Then lower your leg and relax. Do 3 sets of 10.     Towel stretch: Sit on a hard surface with one leg stretched out in front of you. Loop a towel around your toes and the ball of your foot and pull the towel toward your body keeping your knee straight. Hold this position for 15 to 30 seconds then relax. Repeat 3 times.   When the towel stretch becomes too easy, you may begin doing the standing calf stretch.     Standing calf stretch: Facing a wall, put your hands against the wall at about eye level. Keep one leg back with the heel on the floor, and the other leg forward. Turn your back foot slightly inward (as if you were pigeon-toed) as you slowly lean into the wall until you feel a stretch in the back of your calf. Hold for 15 to 30 seconds. Repeat 3 times and then switch the position of your legs and repeat the exercise 3 times. Do this exercise several times each day.     Sitting plantar fascia stretch: Sit in a chair and cross one foot over your other knee. Grab the base of your toes and pull them back toward your leg until you feel a comfortable stretch. Hold 15 seconds and repeat 3 times.   When you can stand comfortably on your injured foot, you can begin standing to stretch the bottom of your foot using the  plantar fascia stretch.     Achilles stretch: Stand with the ball of one foot on a stair. Reach for the bottom step with your heel until you feel a stretch in the arch of your foot. Hold this position for 15 to 30 seconds and then relax. Repeat 3 times.   After you have stretched the bottom muscles of your foot, you can begin strengthening the top muscles of your foot.     Frozen can roll: Roll your bare injured foot back and forth from your heel to your mid-arch over a frozen juice can. Repeat for 3 to 5 minutes. This exercise is particularly helpful if done first thing in the morning.     Towel pickup: With your heel on the ground,  a towel with your toes. Release. Repeat 10 to 20 times. When this gets easy, add more resistance by placing a book or small weight on the towel.     Balance and reach exercises   Stand upright next to a chair with your injured leg farthest from the chair. This will provide you with support if you need it. Stand just on the foot of your injured leg. Try to raise the arch of this foot while keeping your toes on the floor.   A. Keep your foot in this position and reach forward in front of you with the hand farthest away from the chair, allowing your knee to bend. Repeat this 10 times while maintaining the arch height. This exercise can be made more difficult by reaching farther in front of you. Do 2 sets.   B.  the same position as above. While maintaining your arch height, reach the hand farthest away from the chair across your body toward the chair. The farther you reach, the more challenging the exercise. Do 2 sets of 10.     Heel raise: Balance yourself while standing behind a chair or counter. Using the chair to help you, raise your body up onto your toes and hold for 5 seconds. Then slowly lower yourself down without holding onto the chair. Hold onto the chair or counter if you need to. When this exercise becomes less painful, try lowering on one leg only. Repeat 10  times. Do 3 sets of 10.     Side-lying leg lift: Lying on your uninjured side, tighten the front thigh muscles on your top leg and lift that leg 8 to 10 inches away from the other leg. Keep the leg straight and lower slowly. Do 3 sets of 10.   Written by Ladan Suggs, MS, PT, and Keerthi Sanchez PT, [facility], OCS, for [employer].   Published by [employer].   Last modified: 2009-02-09   Last reviewed: 2008-07-07   This content is reviewed periodically and is subject to change as new health information becomes available. The information is intended to inform and educate and is not a replacement for medical evaluation, advice, diagnosis or treatment by a healthcare professional.   Sports Medicine Advisor 2009.1 Index                        Follow-ups after your visit        Additional Services     [facility], AND CHIROPRACTIC REFERRAL       [facility]                  Who to contact     If you have questions or need follow up information about today's clinic visit or your schedule please contact [facility] directly at [phone].  Normal or non-critical lab and imaging results will be communicated to you by MyChart, letter or phone within 4 business days after the clinic has received the results. If you do not hear from us within 7 days, please contact the clinic through MyChart or phone. If you have a critical or abnormal lab result, we will notify you by phone as soon as possible.  Submit refill requests through [employer] or call your pharmacy and they will forward the refill request to us. Please allow 3 business days for your refill to be completed.          Additional Information About Your Visit        [employer] Information     [employer] gives you secure access to your electronic health record. If you see a primary care provider, you can also send messages to your care team and make appointments. If you have questions, please call your primary care clinic.  If you do not have a primary care  "provider, please call [phone] and they will assist you.        Care EveryWhere ID     This is your Care EveryWhere ID. This could be used by other organizations to access your [facility] medical records  [id number]        Your Vitals Were     Pulse Height Pulse Oximetry BMI (Body Mass Index)          82 5' 10\" (1.778 m) 100% 31.57 kg/m2         Blood Pressure from Last 3 Encounters:   06/22/17 128/81   04/20/17 125/75   01/16/17 100/64    Weight from Last 3 Encounters:   06/22/17 220 lb (99.8 kg)   04/20/17 216 lb (98 kg)   01/16/17 211 lb 3.2 oz (95.8 kg)              We Performed the Following     [facility], [facility] CHIR[facility]RACTIC REFERRAL        Primary Care Provider Office Phone # Fax #    Jt Hollis PA-C [phone]       [facility] [id number] [place] [id number]        Equal Access to Services     LORE WOMACK AH: Hadii aad ku hadasho Soomaali, waaxda luqadaha, qaybta kaalmada adeegyada, waxay idiin haycalvinn edvin williamson . So [facility] [phone].    ATENCIÓN: Si habla español, tiene a judge disposición servicios gratuitos de asistencia lingüística. Llame al [phone].    We comply with applicable federal civil rights laws and [place] laws. We do not discriminate on the basis of race, color, national origin, age, disability sex, sexual orientation or gender identity.            Thank you!     Thank you for choosing [facility]  for your care. Our goal is always to provide you with excellent care. Hearing back from our patients is one way we can continue to improve our services. Please take a few minutes to complete the written survey that you may receive in the mail after your visit with us. Thank you!             Your Updated Medication List - Protect others around you: Learn how to safely use, store and throw away your medicines at www.disposemymeds.org.          This list is accurate as of: 6/22/17  5:37 PM.  Always use your most recent med list. "                   Brand Name Dispense Instructions for use Diagnosis    HYDROcodone-acetaminophen 7.5-325 MG per tablet    NORCO     Take 1 tablet by mouth 3 times daily        levonorgestrel-ethinyl estradiol 0.1-20 MG-MCG per tablet    AVIANE,ALESSE,LESSINA    84 tablet    TAKE 1 TABLET DAILY    Encounter for initial prescription of contraceptive pills       LYRICA 50 MG capsule   Generic drug:  pregabalin      150 mg 2 times daily        PARoxetine 10 MG tablet    PAXIL    90 tablet    Take 1 tablet (10 mg) by mouth At Bedtime    JESUS (generalized anxiety disorder)       TYLENOL PO      Take  by mouth 3 times daily.

## 2017-06-22 NOTE — PATIENT INSTRUCTIONS
Plantar Fasciitis   What is plantar fasciitis?   Plantar fasciitis is a painful inflammation of the bottom of the foot between the ball of the foot and the heel.   How does it occur?   There are several possible causes of plantar fasciitis, including:     wearing high heels     gaining weight     increased walking, standing, or stair-climbing   If you wear high-heeled shoes, including western-style boots, for long periods of time, the tough, tendonlike tissue of the bottom of your foot can become shorter. This layer of tissue is called fascia. Pain occurs when you stretch fascia that has shortened. This painful stretching might happen, for example, when you walk barefoot after getting out of bed in the morning.   If you gain weight, you might be more likely to have plantar fasciitis, especially if you walk a lot or  shoes with poor heel cushioning. Normally there is a pad of fatty tissue under your heel bone. Weight gain might break down this fat pad and cause heel pain.   Runners may get plantar fasciitis when they change their workout and increase their mileage or frequency of workouts. It can also occur with a change in exercise surface or terrain, or if your shoes are worn out and don't provide enough cushion for your heels.   If the arches of your foot are abnormally high or low, you are more likely to develop plantar fasciitis than if your arches are normal.   What are the symptoms?   The main symptom of plantar fasciitis is heel pain when you walk. You may also feel pain when you stand and possibly even when you are resting. This pain typically occurs first thing in the morning after you get out of bed, when your foot is placed flat on the floor. The pain occurs because you are stretching the plantar fascia. The pain usually lessens with more walking, but you may have it again after periods of rest.   You may feel no pain when you are sleeping because the position of your feet  during rest allows the fascia to shorten and relax.   How is it diagnosed?   Your healthcare provider will ask about your symptoms. He or she will ask if the bottom of your heel is tender and if you have pain when you stretch the bottom of your foot. An X-ray of your heel may be done.   How is it treated?     Give your painful heel lots of rest. You may need to stay completely off your foot for several days when the pain is severe.     Your healthcare provider may recommend or prescribe anti-inflammatory medicines, such as aspirin or ibuprofen. These drugs decrease pain and inflammation. Adults aged 65 years and older should not take non-steroidal anti-inflammatory medicine for more than 7 days without their healthcare provider's approval. Resting your heel on an ice pack for a few minutes several times a day can also help.     Try to cushion your foot. You can do this by wearing athletic shoes, even at work, for awhile. Heel cushions can also be used. The cushions should be worn in both shoes. They are most helpful if you are overweight or an older adult.     Your provider may recommend special arch supports or inserts for your shoes called orthotics, either custom-made or off the shelf. These supports can be particularly helpful if you have flat feet or high arches.     Your provider may recommend an injection of a cortisone-like medicine.     Lose weight if needed.     A night splint may be recommended. This will keep the plantar fascia stretched while you are sleeping.     Physical therapy for additional treatments may be recommended     Surgery is rarely needed.   How long will the effects last?   You may find that the pain is sometimes worse and sometimes better over time. If you get treatment soon after you notice the pain, the symptoms should stop after several weeks. If, however, you have had plantar fasciitis for a long time, it may take many weeks to months for the pain to go away.   When can I return to  my normal activities?   Everyone recovers from an injury at a different rate. Return to your activities will be determined by how soon your foot recovers, not by how many days or weeks it has been since your injury has occurred. In general, the longer you have symptoms before you start treatment, the longer it will take to get better. The goal of rehabilitation is to return you to your normal activities as soon as is safely possible. If you return too soon you may worsen your injury.   You may safely return to your activities when, starting from the top of the list and progressing to the end, each of the following is true:     You have full range of motion in the injured foot compared with the uninjured foot.     You have full strength of the injured foot compared with the uninjured foot.     You can walk straight ahead without significant pain or limping.   How can I prevent plantar fasciitis?   The best way to prevent plantar fasciitis is to wear shoes that are well made and fit your feet. This is especially important when you exercise or walk a lot or stand for a long time on hard surfaces. Get new athletic shoes before your old shoes stop supporting and cushioning your feet.   You should also:     Avoid repeated jarring to the heel.     Keep a healthy weight.     Do your leg and foot stretching exercises regularly.   Developed by [employer].   Published by [employer].   Last modified: 2008-08-11   Last reviewed: 2008-07-07   This content is reviewed periodically and is subject to change as new health information becomes available. The information is intended to inform and educate and is not a replacement for medical evaluation, advice, diagnosis or treatment by a healthcare professional.   Sports Medicine Advisor 2009.1 Index  Sports Medicine Advisor 2009.1 Credits     2009 [employer] and/or its affiliates. All Rights Reserved.               Plantar Fasciitis Rehabilitation Exercises   You may begin  exercising the muscles of your foot right away by gently stretching them as follows:     Prone hip extension: Lie on your stomach with your legs straight out behind you. Tighten the buttocks and thigh muscles of your injured leg and lift it off the floor about 8 inches. Keep your knee straight. Hold for 5 seconds. Then lower your leg and relax. Do 3 sets of 10.     Towel stretch: Sit on a hard surface with one leg stretched out in front of you. Loop a towel around your toes and the ball of your foot and pull the towel toward your body keeping your knee straight. Hold this position for 15 to 30 seconds then relax. Repeat 3 times.   When the towel stretch becomes too easy, you may begin doing the standing calf stretch.     Standing calf stretch: Facing a wall, put your hands against the wall at about eye level. Keep one leg back with the heel on the floor, and the other leg forward. Turn your back foot slightly inward (as if you were pigeon-toed) as you slowly lean into the wall until you feel a stretch in the back of your calf. Hold for 15 to 30 seconds. Repeat 3 times and then switch the position of your legs and repeat the exercise 3 times. Do this exercise several times each day.     Sitting plantar fascia stretch: Sit in a chair and cross one foot over your other knee. Grab the base of your toes and pull them back toward your leg until you feel a comfortable stretch. Hold 15 seconds and repeat 3 times.   When you can stand comfortably on your injured foot, you can begin standing to stretch the bottom of your foot using the plantar fascia stretch.     Achilles stretch: Stand with the ball of one foot on a stair. Reach for the bottom step with your heel until you feel a stretch in the arch of your foot. Hold this position for 15 to 30 seconds and then relax. Repeat 3 times.   After you have stretched the bottom muscles of your foot, you can begin strengthening the top muscles of your foot.     Frozen can roll: Roll  your bare injured foot back and forth from your heel to your mid-arch over a frozen juice can. Repeat for 3 to 5 minutes. This exercise is particularly helpful if done first thing in the morning.     Towel pickup: With your heel on the ground,  a towel with your toes. Release. Repeat 10 to 20 times. When this gets easy, add more resistance by placing a book or small weight on the towel.     Balance and reach exercises   Stand upright next to a chair with your injured leg farthest from the chair. This will provide you with support if you need it. Stand just on the foot of your injured leg. Try to raise the arch of this foot while keeping your toes on the floor.   A. Keep your foot in this position and reach forward in front of you with the hand farthest away from the chair, allowing your knee to bend. Repeat this 10 times while maintaining the arch height. This exercise can be made more difficult by reaching farther in front of you. Do 2 sets.   B.  the same position as above. While maintaining your arch height, reach the hand farthest away from the chair across your body toward the chair. The farther you reach, the more challenging the exercise. Do 2 sets of 10.     Heel raise: Balance yourself while standing behind a chair or counter. Using the chair to help you, raise your body up onto your toes and hold for 5 seconds. Then slowly lower yourself down without holding onto the chair. Hold onto the chair or counter if you need to. When this exercise becomes less painful, try lowering on one leg only. Repeat 10 times. Do 3 sets of 10.     Side-lying leg lift: Lying on your uninjured side, tighten the front thigh muscles on your top leg and lift that leg 8 to 10 inches away from the other leg. Keep the leg straight and lower slowly. Do 3 sets of 10.   Written by Ladan Suggs, MS, PT, and Keerthi Sanchez PT, [facility], OCS, for [employer].   Published by [employer].   Last modified: 2009-02-09   Last  reviewed: 2008-07-07   This content is reviewed periodically and is subject to change as new health information becomes available. The information is intended to inform and educate and is not a replacement for medical evaluation, advice, diagnosis or treatment by a healthcare professional.   Sports Medicine Advisor 2009.1 Index

## 2017-06-22 NOTE — PROGRESS NOTES
"SUBJECTIVE:   Omaira Smith is a 47 year old female seen here today for her right Foot  What happened: no injury      Onset: 1 month     Description:   Character: Dull ache, cramping feeling     Intensity: moderate    Progression of Symptoms: worse    Accompanying Signs & Symptoms:  Other symptoms: loss of strength    History:   Previous similar pain: no       Precipitating factors:   Trauma or overuse: no     Alleviating factors:  Improved by: nothing     Therapies Tried and outcome:stretching     History of RSD in left leg. Just has nerve implantation device placed and working with PHYSICAL THERAPY for that. Now has developed right foot pain. Needs new PHYSICAL THERAPY referral for that.     PFSH:  Past Medical, social, family histories, medications, and allergies were reviewed and updated.     OBJECTIVE:  /81  Pulse 82  Ht 5' 10\" (1.778 m)  Wt 220 lb (99.8 kg)  SpO2 100%  BMI 31.57 kg/m2  General:  Omaira is awake, alert, and cooperative.  NAD.  Right Foot Exam: Inspection:  no swelling  Tender::plantar fascia  Range of Motion:full range of motion of ankle  5/5 ankle strength.   Calves soft non-tender  Neurovascularly Intact Distally.      ASSESSMENT:     ICD-10-CM    1. Plantar fasciitis M72.2 [facility] REFERRAL   2. RSD (reflex sympathetic dystrophy) G90.50        PLAN:   ice or cold packs 20 minutes every 2-3 hrs as needed to relieve pain and swelling, for the first 2 days. Then can apply heat 20 minutes every 2-3 hrs (avoid sleeping on heating pad) there after as needed.   If you can tolerate, start non-steroidal anti-inflammatory medication like: Ibuprofen 600-800 mg three times daily or Aleve 2 tablets of over the counter strength twice a day as needed.   Tylenol can help with pain also.    Active range of motion exercises encouraged  Activity modification trying to avoid activities that cause you pain.   Follow up 6 wks as needed   See Patient Instructions     Jt " Bunny BECK

## 2017-06-22 NOTE — NURSING NOTE
"Chief Complaint   Patient presents with     Musculoskeletal Problem       Initial /81  Pulse 82  Ht 5' 10\" (1.778 m)  Wt 220 lb (99.8 kg)  SpO2 100%  BMI 31.57 kg/m2 Estimated body mass index is 31.57 kg/(m^2) as calculated from the following:    Height as of this encounter: 5' 10\" (1.778 m).    Weight as of this encounter: 220 lb (99.8 kg).  Medication Reconciliation: complete  Rochelle Acevedo CMA    "

## 2017-08-19 ENCOUNTER — HEALTH MAINTENANCE LETTER (OUTPATIENT)
Age: 47
End: 2017-08-19

## 2017-08-25 DIAGNOSIS — Z30.011 ENCOUNTER FOR INITIAL PRESCRIPTION OF CONTRACEPTIVE PILLS: ICD-10-CM

## 2017-08-25 RX ORDER — LEVONORGESTREL/ETHIN.ESTRADIOL 0.1-0.02MG
TABLET ORAL
Qty: 84 TABLET | Refills: 0 | Status: SHIPPED | OUTPATIENT
Start: 2017-08-25 | End: 2017-11-16

## 2017-08-25 NOTE — LETTER
[facility]                                             [address] [place]  [address]    August 25, 2017    Omaira Smith  [address] [facility] [id number]    Dear Omaira,       We recently received a refill request for levonorgestrel-ethinyl estradiol (AVIANE,DELISA,LESSINA) 0.1-20 MG-MCG per tablet.  We have refilled this for a one time 90 day supply only because you are due for a:    Pap and Physical office visit      Please call at your earliest convenience so that there will not be a delay with your future refills.          Thank you,   Your [facility] Care Team/ks  [phone]

## 2017-08-25 NOTE — TELEPHONE ENCOUNTER
Patient overdue for pap per positive HPV 3/2016.    TC, patient due for:  AFE   Health Maintenance Due   Topic Date Due     PAP Q1 YR DIAGNOSTIC  07/13/2017     Yasmeen Gale RN

## 2017-09-21 ENCOUNTER — OFFICE VISIT (OUTPATIENT)
Dept: FAMILY MEDICINE | Facility: CLINIC | Age: 47
End: 2017-09-21
Payer: COMMERCIAL

## 2017-09-21 VITALS
TEMPERATURE: 100 F | SYSTOLIC BLOOD PRESSURE: 136 MMHG | HEART RATE: 105 BPM | OXYGEN SATURATION: 99 % | DIASTOLIC BLOOD PRESSURE: 88 MMHG

## 2017-09-21 DIAGNOSIS — R10.823 RIGHT LOWER QUADRANT ABDOMINAL TENDERNESS WITH REBOUND TENDERNESS: Primary | ICD-10-CM

## 2017-09-21 LAB
ALBUMIN SERPL-MCNC: 3.6 G/DL (ref 3.4–5)
ALP SERPL-CCNC: 85 U/L (ref 40–150)
ALT SERPL W P-5'-P-CCNC: 29 U/L (ref 0–50)
ANION GAP SERPL CALCULATED.3IONS-SCNC: 6 MMOL/L (ref 3–14)
AST SERPL W P-5'-P-CCNC: 19 U/L (ref 0–45)
BASOPHILS # BLD AUTO: 0 10E9/L (ref 0–0.2)
BASOPHILS NFR BLD AUTO: 0.2 %
BILIRUB SERPL-MCNC: 0.5 MG/DL (ref 0.2–1.3)
BUN SERPL-MCNC: 12 MG/DL (ref 7–30)
CALCIUM SERPL-MCNC: 8.6 MG/DL (ref 8.5–10.1)
CHLORIDE SERPL-SCNC: 102 MMOL/L (ref 94–109)
CO2 SERPL-SCNC: 27 MMOL/L (ref 20–32)
CREAT SERPL-MCNC: 0.67 MG/DL (ref 0.52–1.04)
DIFFERENTIAL METHOD BLD: ABNORMAL
EOSINOPHIL # BLD AUTO: 0.1 10E9/L (ref 0–0.7)
EOSINOPHIL NFR BLD AUTO: 0.4 %
ERYTHROCYTE [DISTWIDTH] IN BLOOD BY AUTOMATED COUNT: 12.8 % (ref 10–15)
GFR SERPL CREATININE-BSD FRML MDRD: >90 ML/MIN/1.7M2
GLUCOSE SERPL-MCNC: 106 MG/DL (ref 70–99)
HCT VFR BLD AUTO: 37.3 % (ref 35–47)
HGB BLD-MCNC: 12.4 G/DL (ref 11.7–15.7)
LYMPHOCYTES # BLD AUTO: 2.6 10E9/L (ref 0.8–5.3)
LYMPHOCYTES NFR BLD AUTO: 13.1 %
MCH RBC QN AUTO: 30.5 PG (ref 26.5–33)
MCHC RBC AUTO-ENTMCNC: 33.2 G/DL (ref 31.5–36.5)
MCV RBC AUTO: 92 FL (ref 78–100)
MONOCYTES # BLD AUTO: 1.7 10E9/L (ref 0–1.3)
MONOCYTES NFR BLD AUTO: 8.3 %
NEUTROPHILS # BLD AUTO: 15.6 10E9/L (ref 1.6–8.3)
NEUTROPHILS NFR BLD AUTO: 78 %
PLATELET # BLD AUTO: 189 10E9/L (ref 150–450)
POTASSIUM SERPL-SCNC: 4.1 MMOL/L (ref 3.4–5.3)
PROT SERPL-MCNC: 7.4 G/DL (ref 6.8–8.8)
RBC # BLD AUTO: 4.07 10E12/L (ref 3.8–5.2)
SODIUM SERPL-SCNC: 135 MMOL/L (ref 133–144)
WBC # BLD AUTO: 20 10E9/L (ref 4–11)

## 2017-09-21 PROCEDURE — 36415 COLL VENOUS BLD VENIPUNCTURE: CPT | Performed by: PHYSICIAN ASSISTANT

## 2017-09-21 PROCEDURE — 80053 COMPREHEN METABOLIC PANEL: CPT | Performed by: PHYSICIAN ASSISTANT

## 2017-09-21 PROCEDURE — 85025 COMPLETE CBC W/AUTO DIFF WBC: CPT | Performed by: PHYSICIAN ASSISTANT

## 2017-09-21 PROCEDURE — 99214 OFFICE O/P EST MOD 30 MIN: CPT | Performed by: PHYSICIAN ASSISTANT

## 2017-09-21 NOTE — PROGRESS NOTES
SUBJECTIVE:                                                    Omaira Smith is a 47 year old female who presents to clinic today for the following health issues:    Stomach pain      Duration: 2 days    Description (location/character/radiation): fever, lower right sided stomach pain, nausea, loss of appetite     Intensity:  severe    Accompanying signs and symptoms:     History (similar episodes/previous evaluation): None    Precipitating or alleviating factors: None    Therapies tried and outcome: took narco about an hour ago    Normal bowel movements and no urinary issues per pt  9/10 pain. Pain with driving here.   Moving very slowly holding right lower quadrant region.   Couple days of loose stools with nausea.       Problem list and histories reviewed & adjusted, as indicated.  Additional history: as documented    Patient Active Problem List   Diagnosis     Migraines     RSD (reflex sympathetic dystrophy)     CARDIOVASCULAR SCREENING; LDL GOAL LESS THAN 160     Ankle pain     Cervical high risk HPV (human papillomavirus) test positive     JESUS (generalized anxiety disorder)     Panic attack     Plantar fasciitis     Past Surgical History:   Procedure Laterality Date     CHOLECYSTECTOMY       ENT SURGERY  age 15    tonsilectomy     EYE SURGERY  age 5    Rt lazy eye repair     EYE SURGERY  age 15    Rt eye     ORTHOPEDIC SURGERY      Lt leg 5X       Social History   Substance Use Topics     Smoking status: Former Smoker     Smokeless tobacco: Never Used     Alcohol use Yes      Comment: occ     Family History   Problem Relation Age of Onset     Family history unknown: Yes         Current Outpatient Prescriptions   Medication Sig Dispense Refill     levonorgestrel-ethinyl estradiol (AVIANE,ALESSE,LESSINA) 0.1-20 MG-MCG per tablet TAKE 1 TABLET DAILY 84 tablet 0     PARoxetine (PAXIL) 10 MG tablet Take 1 tablet (10 mg) by mouth At Bedtime 90 tablet 1     HYDROcodone-acetaminophen (NORCO) 7.5-325 MG per tablet  Take 1 tablet by mouth 3 times daily       Acetaminophen (TYLENOL PO) Take  by mouth 3 times daily.       Allergies   Allergen Reactions     Gabapentin Other (See Comments)     Irritability     Ibuprofen Other (See Comments)     LW Reaction: stomach pain     BP Readings from Last 3 Encounters:   09/21/17 136/88   06/22/17 128/81   04/20/17 125/75    Wt Readings from Last 3 Encounters:   06/22/17 220 lb (99.8 kg)   04/20/17 216 lb (98 kg)   01/16/17 211 lb 3.2 oz (95.8 kg)                  Labs reviewed in EPIC        ROS:  Constitutional, HEENT, cardiovascular, pulmonary, gi and gu systems are negative, except as otherwise noted.      OBJECTIVE:   /88  Pulse 105  Temp 100  F (37.8  C) (Oral)  SpO2 99%  There is no height or weight on file to calculate BMI.  GENERAL: healthy, alert and no distress- appear ill and in pain  Head: Normocephalic, atraumatic.  Eyes: Conjunctiva clear, non icteric. PERRLA.  Ears: External ears and TMs normal BL.  Nose: Septum midline, nasal mucosa pink and moist. No discharge.  Mouth / Throat: Normal dentition.  No oral lesions. Pharynx non erythematous, tonsils without hypertrophy.  Neck: Supple, no enlarged LN, trachea midline.  Heart: S1 and S2 normal, no murmurs, clicks, gallops or rubs. Regular rate and rhythm.  Chest: Clear; no wheezes or rales.  The abdomen is  With RLQ tenderness, guarding, no mass, or organomegaly. Bowel sounds are decreased.    Diagnostic Test Results:  Results for orders placed or performed in visit on 09/21/17 (from the past 24 hour(s))   CBC with platelets differential   Result Value Ref Range    WBC 20.0 (H) 4.0 - 11.0 10e9/L    RBC Count 4.07 3.8 - 5.2 10e12/L    Hemoglobin 12.4 11.7 - 15.7 g/dL    Hematocrit 37.3 35.0 - 47.0 %    MCV 92 78 - 100 fl    MCH 30.5 26.5 - 33.0 pg    MCHC 33.2 31.5 - 36.5 g/dL    RDW 12.8 10.0 - 15.0 %    Platelet Count 189 150 - 450 10e9/L    Diff Method Automated Method     % Neutrophils 78.0 %    % Lymphocytes 13.1 %     % Monocytes 8.3 %    % Eosinophils 0.4 %    % Basophils 0.2 %    Absolute Neutrophil 15.6 (H) 1.6 - 8.3 10e9/L    Absolute Lymphocytes 2.6 0.8 - 5.3 10e9/L    Absolute Monocytes 1.7 (H) 0.0 - 1.3 10e9/L    Absolute Eosinophils 0.1 0.0 - 0.7 10e9/L    Absolute Basophils 0.0 0.0 - 0.2 10e9/L       ASSESSMENT/PLAN:         ICD-10-CM    1. Right lower quadrant abdominal tenderness with rebound tenderness R10.823 CBC with platelets differential     *UA reflex to Microscopic and Culture ([facility] (except [place] and [place])     Comprehensive metabolic panel   discussed with Dr. Otero, general surgeon   Who recommend follow up in ED.   Omaira DARIAN Smith aunt is going to take her to ED.   Did talk with ED Dr for patient transfer. She has a ride to take her.    Jt Hollis PA-C  [facility]

## 2017-09-21 NOTE — MR AVS SNAPSHOT
After Visit Summary   9/21/2017    Omaira Smith    MRN: [id number]           Patient Information     Date Of Birth          1970        Visit Information        Provider Department      9/21/2017 1:10 PM Jt Hollis PA-C [facility]        Today's Diagnoses     Right lower quadrant abdominal tenderness with rebound tenderness    -  1       Follow-ups after your visit        Who to contact     If you have questions or need follow up information about today's clinic visit or your schedule please contact [facility] directly at [phone].  Normal or non-critical lab and imaging results will be communicated to you by [facility], letter or phone within 4 business days after the clinic has received the results. If you do not hear from us within 7 days, please contact the clinic through [employer] or phone. If you have a critical or abnormal lab result, we will notify you by phone as soon as possible.  Submit refill requests through [employer] or call your pharmacy and they will forward the refill request to us. Please allow 3 business days for your refill to be completed.          Additional Information About Your Visit        MyChart Information     [employer] gives you secure access to your electronic health record. If you see a primary care provider, you can also send messages to your care team and make appointments. If you have questions, please call your primary care clinic.  If you do not have a primary care provider, please call [phone] and they will assist you.        Care EveryWhere ID     This is your Care EveryWhere ID. This could be used by other organizations to access your [place] medical records  [id number]        Your Vitals Were     Pulse Temperature Pulse Oximetry             105 100  F (37.8  C) (Oral) 99%          Blood Pressure from Last 3 Encounters:   09/21/17 136/88   06/22/17 128/81   04/20/17 125/75    Weight from Last 3 Encounters:    06/22/17 220 lb (99.8 kg)   04/20/17 216 lb (98 kg)   01/16/17 211 lb 3.2 oz (95.8 kg)              We Performed the Following     *UA reflex to Microscopic and Culture ([facility] (except [place] and [place])     CBC with platelets differential     Comprehensive metabolic panel        Primary Care Provider Office Phone # Fax #    Jt Hollis PA-C [phone] [address] [facility] [address]        Equal Access to Services     [facility] SHANTA : Hadii aad ku hadasho Soomaali, waaxda luqadaha, qaybta kaalmada adeegyada, waxde aragon haycalvinn edvin williamson . So [facility] [phone].    ATENCIÓN: Si habla español, tiene a judge disposición servicios gratuitos de asistencia lingüística. [facility] [phone].    We comply with applicable federal civil rights laws and [place] laws. We do not discriminate on the basis of race, color, national origin, age, disability sex, sexual orientation or gender identity.            Thank you!     Thank you for choosing [facility]  for your care. Our goal is always to provide you with excellent care. Hearing back from our patients is one way we can continue to improve our services. Please take a few minutes to complete the written survey that you may receive in the mail after your visit with us. Thank you!             Your Updated Medication List - Protect others around you: Learn how to safely use, store and throw away your medicines at www.disposemymeds.org.          This list is accurate as of: 9/21/17  1:22 PM.  Always use your most recent med list.                   Brand Name Dispense Instructions for use Diagnosis    HYDROcodone-acetaminophen 7.5-325 MG per tablet    NORCO     Take 1 tablet by mouth 3 times daily        levonorgestrel-ethinyl estradiol 0.1-20 MG-MCG per tablet    AVIANE,ALESSE,LESSINA    84 tablet    TAKE 1 TABLET DAILY    Encounter for initial prescription of contraceptive pills       PARoxetine  10 MG tablet    PAXIL    90 tablet    Take 1 tablet (10 mg) by mouth At Bedtime    JESUS (generalized anxiety disorder)       TYLENOL PO      Take  by mouth 3 times daily.

## 2017-09-21 NOTE — NURSING NOTE
"Chief Complaint   Patient presents with     Flank Pain       Initial /88  Pulse 105  Temp 100  F (37.8  C) (Oral)  SpO2 99% Estimated body mass index is 31.57 kg/(m^2) as calculated from the following:    Height as of 6/22/17: 5' 10\" (1.778 m).    Weight as of 6/22/17: 220 lb (99.8 kg).  Medication Reconciliation: complete  Rochelle Acevedo CMA    "

## 2017-09-25 ENCOUNTER — MYC MEDICAL ADVICE (OUTPATIENT)
Dept: FAMILY MEDICINE | Facility: CLINIC | Age: 47
End: 2017-09-25

## 2017-10-24 ENCOUNTER — TELEPHONE (OUTPATIENT)
Dept: FAMILY MEDICINE | Facility: CLINIC | Age: 47
End: 2017-10-24

## 2017-10-24 DIAGNOSIS — F41.1 GAD (GENERALIZED ANXIETY DISORDER): ICD-10-CM

## 2017-10-24 RX ORDER — PAROXETINE 10 MG/1
10 TABLET, FILM COATED ORAL AT BEDTIME
Qty: 90 TABLET | Refills: 1 | Status: SHIPPED | OUTPATIENT
Start: 2017-10-24 | End: 2018-05-22

## 2017-10-24 NOTE — TELEPHONE ENCOUNTER
Reason for Call:  Medication or medication refill:    Do you use a [facility]?  Name of the pharmacy and phone number for the current request:  Mail Order    Name of the medication requested: PARoxetine (PAXIL) 10 MG tablet requesting a 90 day supply fax number for [facility] is [phone]    Other request:     Can we leave a detailed message on this number? YES    Phone number patient can be reached at: Home number on file [phone] (home)    Best Time:     Call taken on 10/24/2017 at 9:04 AM by Debbie Pretty

## 2017-11-07 ENCOUNTER — DOCUMENTATION ONLY (OUTPATIENT)
Dept: LAB | Facility: CLINIC | Age: 47
End: 2017-11-07

## 2017-11-07 DIAGNOSIS — Z13.6 CARDIOVASCULAR SCREENING; LDL GOAL LESS THAN 160: Primary | ICD-10-CM

## 2017-11-07 DIAGNOSIS — Z13.1 SCREENING FOR DIABETES MELLITUS: ICD-10-CM

## 2017-11-07 NOTE — PROGRESS NOTES
.Please place or confirm pending lab orders for upcoming lab appointment on 11/10/17  Thanks,  Heidi

## 2017-11-09 ENCOUNTER — DOCUMENTATION ONLY (OUTPATIENT)
Dept: LAB | Facility: CLINIC | Age: 47
End: 2017-11-09

## 2017-11-09 DIAGNOSIS — Z13.1 SCREENING FOR DIABETES MELLITUS: ICD-10-CM

## 2017-11-09 DIAGNOSIS — R73.01 IMPAIRED FASTING GLUCOSE: Primary | ICD-10-CM

## 2017-11-09 DIAGNOSIS — Z13.6 CARDIOVASCULAR SCREENING; LDL GOAL LESS THAN 160: ICD-10-CM

## 2017-11-09 LAB
ANION GAP SERPL CALCULATED.3IONS-SCNC: 8 MMOL/L (ref 3–14)
BUN SERPL-MCNC: 14 MG/DL (ref 7–30)
CALCIUM SERPL-MCNC: 8.8 MG/DL (ref 8.5–10.1)
CHLORIDE SERPL-SCNC: 107 MMOL/L (ref 94–109)
CHOLEST SERPL-MCNC: 217 MG/DL
CO2 SERPL-SCNC: 24 MMOL/L (ref 20–32)
CREAT SERPL-MCNC: 0.67 MG/DL (ref 0.52–1.04)
GFR SERPL CREATININE-BSD FRML MDRD: >90 ML/MIN/1.7M2
GLUCOSE SERPL-MCNC: 85 MG/DL (ref 70–99)
HDLC SERPL-MCNC: 57 MG/DL
LDLC SERPL CALC-MCNC: 126 MG/DL
NONHDLC SERPL-MCNC: 160 MG/DL
POTASSIUM SERPL-SCNC: 4.4 MMOL/L (ref 3.4–5.3)
SODIUM SERPL-SCNC: 139 MMOL/L (ref 133–144)
TRIGL SERPL-MCNC: 171 MG/DL

## 2017-11-09 PROCEDURE — 80061 LIPID PANEL: CPT | Performed by: PHYSICIAN ASSISTANT

## 2017-11-09 PROCEDURE — 36415 COLL VENOUS BLD VENIPUNCTURE: CPT | Performed by: PHYSICIAN ASSISTANT

## 2017-11-09 PROCEDURE — 80048 BASIC METABOLIC PNL TOTAL CA: CPT | Performed by: PHYSICIAN ASSISTANT

## 2017-11-16 ENCOUNTER — OFFICE VISIT (OUTPATIENT)
Dept: FAMILY MEDICINE | Facility: CLINIC | Age: 47
End: 2017-11-16
Payer: COMMERCIAL

## 2017-11-16 VITALS
TEMPERATURE: 99.5 F | OXYGEN SATURATION: 98 % | DIASTOLIC BLOOD PRESSURE: 86 MMHG | WEIGHT: 214 LBS | SYSTOLIC BLOOD PRESSURE: 136 MMHG | BODY MASS INDEX: 30.71 KG/M2 | HEART RATE: 90 BPM

## 2017-11-16 DIAGNOSIS — Z30.011 ENCOUNTER FOR INITIAL PRESCRIPTION OF CONTRACEPTIVE PILLS: ICD-10-CM

## 2017-11-16 DIAGNOSIS — F41.1 GAD (GENERALIZED ANXIETY DISORDER): ICD-10-CM

## 2017-11-16 DIAGNOSIS — Z00.00 ROUTINE GENERAL MEDICAL EXAMINATION AT A HEALTH CARE FACILITY: Primary | ICD-10-CM

## 2017-11-16 PROCEDURE — 99396 PREV VISIT EST AGE 40-64: CPT | Performed by: PHYSICIAN ASSISTANT

## 2017-11-16 PROCEDURE — 87624 HPV HI-RISK TYP POOLED RSLT: CPT | Performed by: FAMILY MEDICINE

## 2017-11-16 PROCEDURE — G0145 SCR C/V CYTO,THINLAYER,RESCR: HCPCS | Performed by: FAMILY MEDICINE

## 2017-11-16 RX ORDER — LEVONORGESTREL/ETHIN.ESTRADIOL 0.1-0.02MG
1 TABLET ORAL DAILY
Qty: 84 TABLET | Refills: 3 | Status: SHIPPED | OUTPATIENT
Start: 2017-11-16 | End: 2018-10-08

## 2017-11-16 NOTE — NURSING NOTE
"Chief Complaint   Patient presents with     Physical       Initial BP (!) 135/92  Pulse 90  Temp 99.5  F (37.5  C) (Oral)  Wt 214 lb (97.1 kg)  LMP 11/07/2017  SpO2 98%  BMI 30.71 kg/m2 Estimated body mass index is 30.71 kg/(m^2) as calculated from the following:    Height as of 6/22/17: 5' 10\" (1.778 m).    Weight as of this encounter: 214 lb (97.1 kg).  Medication Reconciliation: complete    ABIGAIL Babb MA    "

## 2017-11-16 NOTE — MR AVS SNAPSHOT
After Visit Summary   11/16/2017    Omaira Smith    MRN: [id number]           Patient Information     Date Of Birth          1970        Visit Information        Provider Department      11/16/2017 4:30 PM Jt Hollis PA-C [facility] [place]        Today's Diagnoses     Routine general medical examination at a health care facility    -  1    Encounter for initial prescription of contraceptive pills          Care Instructions      Preventive Health Recommendations  Female Ages 40 to 49    Yearly exam:     See your health care provider every year in order to  1. Review health changes.   2. Discuss preventive care.    3. Review your medicines if your doctor prescribed any.      Get a Pap test every three years (unless you have an abnormal result and your provider advises testing more often).      If you get Pap tests with HPV test, you only need to test every 5 years, unless you have an abnormal result. You do not need a Pap test if your uterus was removed (hysterectomy) and you have not had cancer.      You should be tested each year for STDs (sexually transmitted diseases), if you're at risk.       Ask your doctor if you should have a mammogram.      Have a colonoscopy (test for colon cancer) if someone in your family has had colon cancer or polyps before age 50.       Have a cholesterol test every 5 years.       Have a diabetes test (fasting glucose) after age 45. If you are at risk for diabetes, you should have this test every 3 years.    Shots: Get a flu shot each year. Get a tetanus shot every 10 years.     Nutrition:     Eat at least 5 servings of fruits and vegetables each day.    Eat whole-grain bread, whole-wheat pasta and brown rice instead of white grains and rice.    Talk to your provider about Calcium and Vitamin D.     Lifestyle    Exercise at least 150 minutes a week (an average of 30 minutes a day, 5 days a week). This will help you control your weight and  prevent disease.    Limit alcohol to one drink per day.    No smoking.     Wear sunscreen to prevent skin cancer.    See your dentist every six months for an exam and cleaning.          Follow-ups after your visit        Who to contact     If you have questions or need follow up information about today's clinic visit or your schedule please contact [facility] directly at [phone].  Normal or non-critical lab and imaging results will be communicated to you by MyChart, letter or phone within 4 business days after the clinic has received the results. If you do not hear from us within 7 days, please contact the clinic through Shurop[employer] or phone. If you have a critical or abnormal lab result, we will notify you by phone as soon as possible.  Submit refill requests through [employer]Week or call your pharmacy and they will forward the refill request to us. Please allow 3 business days for your refill to be completed.          Additional Information About Your Visit        [employer] Verify Information     HyperWeek gives you secure access to your electronic health record. If you see a primary care provider, you can also send messages to your care team and make appointments. If you have questions, please call your primary care clinic.  If you do not have a primary care provider, please call [phone] and they will assist you.        Care EveryWhere ID     This is your Care EveryWhere ID. This could be used by other organizations to access your [facility] medical records  [id number]        Your Vitals Were     Pulse Temperature Last Period Pulse Oximetry BMI (Body Mass Index)       90 99.5  F (37.5  C) (Oral) 11/07/2017 98% 30.71 kg/m2        Blood Pressure from Last 3 Encounters:   11/16/17 136/86   09/21/17 136/88   06/22/17 128/81    Weight from Last 3 Encounters:   11/16/17 214 lb (97.1 kg)   06/22/17 220 lb (99.8 kg)   04/20/17 216 lb (98 kg)              We Performed the Following     HPV High Risk Types DNA Cervical      Pap imaged thin layer screen with HPV - recommended age 30 - 65 years (select HPV order below)          Today's Medication Changes          These changes are accurate as of: 11/16/17  5:35 PM.  If you have any questions, ask your nurse or doctor.               These medicines have changed or have updated prescriptions.        Dose/Directions    levonorgestrel-ethinyl estradiol 0.1-20 MG-MCG per tablet   Commonly known as:  DELISA MCKEON LESSINA   This may have changed:  See the new instructions.   Used for:  Encounter for initial prescription of contraceptive pills   Changed by:  Jt Hollis PA-C        Dose:  1 tablet   Take 1 tablet by mouth daily   Quantity:  84 tablet   Refills:  3            Where to get your medicines      Some of these will need a paper prescription and others can be bought over the counter.  Ask your nurse if you have questions.     Bring a paper prescription for each of these medications     levonorgestrel-ethinyl estradiol 0.1-20 MG-MCG per tablet                Primary Care Provider Office Phone # Fax #    Jt Hollis PA-C [phone] [address] [facility] [address]        Equal Access to Services     REINA WOMACK : Hadii shazia sosa hadasho Soquirinoali, waaxda luqadaha, qaybta kaalmada adestephanie, paula williamson . So [facility] [phone].    ATENCIÓN: Si habla español, tiene a judge disposición servicios gratuitos de asistencia lingüística. Llame al [phone].    We comply with applicable federal civil rights laws and [place] laws. We do not discriminate on the basis of race, color, national origin, age, disability, sex, sexual orientation, or gender identity.            Thank you!     Thank you for choosing [facility]  for your care. Our goal is always to provide you with excellent care. Hearing back from our patients is one way we can continue to improve our services. Please take a few minutes to complete the  written survey that you may receive in the mail after your visit with us. Thank you!             Your Updated Medication List - Protect others around you: Learn how to safely use, store and throw away your medicines at www.disposemymeds.org.          This list is accurate as of: 11/16/17  5:35 PM.  Always use your most recent med list.                   Brand Name Dispense Instructions for use Diagnosis    HYDROcodone-acetaminophen 7.5-325 MG per tablet    NORCO     Take 1 tablet by mouth 3 times daily        levonorgestrel-ethinyl estradiol 0.1-20 MG-MCG per tablet    AVIANE,ALESSE,LESSINA    84 tablet    Take 1 tablet by mouth daily    Encounter for initial prescription of contraceptive pills       PARoxetine 10 MG tablet    PAXIL    90 tablet    Take 1 tablet (10 mg) by mouth At Bedtime    JESUS (generalized anxiety disorder)       TYLENOL PO      Take  by mouth 3 times daily.

## 2017-11-16 NOTE — PROGRESS NOTES
SUBJECTIVE:   CC: Omaira Smith is an 47 year old woman who presents for preventive health visit.     Physical   Annual:     Getting at least 3 servings of Calcium per day::  Yes    Bi-annual eye exam::  Yes    Dental care twice a year::  Yes    Sleep apnea or symptoms of sleep apnea::  None    Diet::  Regular (no restrictions)    Frequency of exercise::  1 day/week    Duration of exercise::  Less than 15 minutes    Taking medications regularly::  Yes    Medication side effects::  Not applicable    Additional concerns today::  No    Has screening at work. And A1C 5.9 and was told to recheck it.   PAP - history of HPV 18 and neg colposcopy.     History of anxiety and stable on meds.     Today's PHQ-2 Score:   PHQ-2 ( 1999 Pfizer) 11/13/2017   Q1: Little interest or pleasure in doing things 1   Q2: Feeling down, depressed or hopeless 1   PHQ-2 Score 2   Q1: Little interest or pleasure in doing things Several days   Q2: Feeling down, depressed or hopeless Several days   PHQ-2 Score 2       Abuse: Current or Past(Physical, Sexual or Emotional)- No  Do you feel safe in your environment - Yes    Social History   Substance Use Topics     Smoking status: Former Smoker     Smokeless tobacco: Never Used     Alcohol use Yes      Comment: occ         Reviewed orders with patient.  Reviewed health maintenance and updated orders accordingly - Yes  Labs reviewed in EPIC  BP Readings from Last 3 Encounters:   11/16/17 136/86   09/21/17 136/88   06/22/17 128/81    Wt Readings from Last 3 Encounters:   11/16/17 214 lb (97.1 kg)   06/22/17 220 lb (99.8 kg)   04/20/17 216 lb (98 kg)                  Patient Active Problem List   Diagnosis     Migraines     RSD (reflex sympathetic dystrophy)     CARDIOVASCULAR SCREENING; LDL GOAL LESS THAN 160     Ankle pain     Cervical high risk HPV (human papillomavirus) test positive     JESUS (generalized anxiety disorder)     Panic attack     Plantar fasciitis     BMI 30.0-30.9,adult     Past  Surgical History:   Procedure Laterality Date     APPENDECTOMY  2017     BACK SURGERY  2017     CHOLECYSTECTOMY       ENT SURGERY  age 15    tonsilectomy     EYE SURGERY  age 5    Rt lazy eye repair     EYE SURGERY  age 15    Rt eye     ORTHOPEDIC SURGERY      Lt leg 5X       Social History   Substance Use Topics     Smoking status: Former Smoker     Smokeless tobacco: Never Used     Alcohol use Yes      Comment: occ     Family History   Problem Relation Age of Onset     Family history unknown: Yes         Current Outpatient Prescriptions   Medication Sig Dispense Refill     levonorgestrel-ethinyl estradiol (AVIANE,ALESSE,LESSINA) 0.1-20 MG-MCG per tablet Take 1 tablet by mouth daily 84 tablet 3     PARoxetine (PAXIL) 10 MG tablet Take 1 tablet (10 mg) by mouth At Bedtime 90 tablet 1     HYDROcodone-acetaminophen (NORCO) 7.5-325 MG per tablet Take 1 tablet by mouth 3 times daily       Acetaminophen (TYLENOL PO) Take  by mouth 3 times daily.       [DISCONTINUED] levonorgestrel-ethinyl estradiol (AVIANE,ALESSE,LESSINA) 0.1-20 MG-MCG per tablet TAKE 1 TABLET DAILY 84 tablet 0     Allergies   Allergen Reactions     Gabapentin Other (See Comments)     Irritability     Ibuprofen Other (See Comments)     LW Reaction: stomach pain           Patient over age 50, mutual decision to screen reflected in health maintenance.      Pertinent mammograms are reviewed under the imaging tab.  History of abnormal Pap smear: YES - updated in Problem List and Health Maintenance accordingly    Reviewed and updated as needed this visit by clinical staffTobacco  Allergies  Meds  Problems  Med Hx  Surg Hx  Fam Hx  Soc Hx          Reviewed and updated as needed this visit by Provider  Allergies  Meds  Problems          Past Medical History:   Diagnosis Date     Anxiety      Cervical high risk HPV (human papillomavirus) test positive 3/2015, 3/2016    type 18     Migraines      RSD (reflex sympathetic dystrophy)       Past Surgical  History:   Procedure Laterality Date     APPENDECTOMY  2017     BACK SURGERY  2017     CHOLECYSTECTOMY       ENT SURGERY  age 15    tonsilectomy     EYE SURGERY  age 5    Rt lazy eye repair     EYE SURGERY  age 15    Rt eye     ORTHOPEDIC SURGERY      Lt leg 5X       Review of Systems  C: NEGATIVE for fever, chills, change in weight  I: NEGATIVE for worrisome rashes, moles or lesions  E: NEGATIVE for vision changes or irritation  ENT: NEGATIVE for ear, mouth and throat problems  R: NEGATIVE for significant cough or SOB  B: NEGATIVE for masses, tenderness or discharge  CV: NEGATIVE for chest pain, palpitations or peripheral edema  GI: NEGATIVE for nausea, abdominal pain, heartburn, or change in bowel habits  : NEGATIVE for unusual urinary or vaginal symptoms. Periods are regular.  M: NEGATIVE for significant arthralgias or myalgia  N: NEGATIVE for weakness, dizziness or paresthesias  P: NEGATIVE for changes in mood or affect     OBJECTIVE:   /86  Pulse 90  Temp 99.5  F (37.5  C) (Oral)  Wt 214 lb (97.1 kg)  LMP 11/07/2017  SpO2 98%  BMI 30.71 kg/m2  Physical Exam  GENERAL: healthy, alert and no distress  EYES: Eyes grossly normal to inspection, PERRL and conjunctivae and sclerae normal  HENT: ear canals and TM's normal, nose and mouth without ulcers or lesions  NECK: no adenopathy, no asymmetry, masses, or scars and thyroid normal to palpation  RESP: lungs clear to auscultation - no rales, rhonchi or wheezes  BREAST: normal without masses, tenderness or nipple discharge and no palpable axillary masses or adenopathy  CV: regular rate and rhythm, normal S1 S2, no S3 or S4, no murmur, click or rub, no peripheral edema and peripheral pulses strong  ABDOMEN: soft, nontender, no hepatosplenomegaly, no masses and bowel sounds normal   (female): normal female external genitalia, normal urethral meatus, vaginal mucosa pink, moist, well rugated, and normal cervix/adnexa/uterus without masses or discharge  MS:  "no gross musculoskeletal defects noted, no edema  SKIN: no suspicious lesions or rashes  NEURO: Normal strength and tone, mentation intact and speech normal  PSYCH: mentation appears normal, affect normal/bright    ASSESSMENT/PLAN:       ICD-10-CM    1. Routine general medical examination at a health care facility Z00.00 Pap imaged thin layer screen with HPV - recommended age 30 - 65 years (select HPV order below)     HPV High Risk Types DNA Cervical   2. Encounter for initial prescription of contraceptive pills Z30.011 levonorgestrel-ethinyl estradiol (AVIANE,ALESSE,LESSINA) 0.1-20 MG-MCG per tablet   3. BMI 30.0-30.9,adult Z68.30    4. JESUS (generalized anxiety disorder) F41.1        COUNSELING:  Reviewed preventive health counseling, as reflected in patient instructions       Regular exercise       Healthy diet/nutrition    BP Screening:   Last 3 BP Readings:    BP Readings from Last 3 Encounters:   11/16/17 136/86   09/21/17 136/88   06/22/17 128/81       The following was recommended to the patient:  Re-screen BP within a year and recommended lifestyle modifications     reports that she has quit smoking. She has never used smokeless tobacco.    Estimated body mass index is 30.71 kg/(m^2) as calculated from the following:    Height as of 6/22/17: 5' 10\" (1.778 m).    Weight as of this encounter: 214 lb (97.1 kg).   Weight management plan: Discussed healthy diet and exercise guidelines and patient will follow up in 12 months in clinic to re-evaluate.    Counseling Resources:  ATP IV Guidelines  Pooled Cohorts Equation Calculator  Breast Cancer Risk Calculator  FRAX Risk Assessment  ICSI Preventive Guidelines  Dietary Guidelines for Americans, 2010  USDA's MyPlate  ASA Prophylaxis  Lung CA Screening    The 10-year ASCVD risk score (Manju VALDEZ Jr, et al., 2013) is: 1.2%    Values used to calculate the score:      Age: 47 years      Sex: Female      Is Non- : No      Diabetic: No      Tobacco " smoker: No      Systolic Blood Pressure: 136 mmHg      Is BP treated: No      HDL Cholesterol: 57 mg/dL      Total Cholesterol: 217 mg/dL       Jt Hollis PA-C  [facility]

## 2017-11-16 NOTE — PROGRESS NOTES
"   SUBJECTIVE:   CC: Omaira Smith is an 47 year old woman who presents for preventive health visit.     Healthy Habits:    Do you get at least three servings of calcium containing foods daily (dairy, green leafy vegetables, etc.)? {YES/NO, DAIRY INTAKE:123109::\"yes\"}    Amount of exercise or daily activities, outside of work: {AMOUNT EXERCISE:187664}    Problems taking medications regularly {Yes /No default:898559::\"No\"}    Medication side effects: {Yes /No default.:848650::\"No\"}    Have you had an eye exam in the past two years? {YESNOBLANK:484280}    Do you see a dentist twice per year? {YESNOBLANK:135888}    Do you have sleep apnea, excessive snoring or daytime drowsiness?{YESNOBLANK:621211}    {Outside tests to abstract? :948432}    {additional problems to add (Optional):906817}    Today's PHQ-2 Score:   PHQ-2 ( 1999 Pfizer) 11/13/2017 1/16/2017   Q1: Little interest or pleasure in doing things 1 0   Q2: Feeling down, depressed or hopeless 1 0   PHQ-2 Score 2 0   Q1: Little interest or pleasure in doing things Several days -   Q2: Feeling down, depressed or hopeless Several days -   PHQ-2 Score 2 -     {PHQ-2 LOOK IN ASSESSMENTS (Optional) :598036}  Abuse: Current or Past(Physical, Sexual or Emotional)- {YES/NO/NA:143899}  Do you feel safe in your environment - {YES/NO/NA:376766}    Social History   Substance Use Topics     Smoking status: Former Smoker     Smokeless tobacco: Never Used     Alcohol use Yes      Comment: occ     {ETOH AUDIT:195559}    Reviewed orders with patient.  Reviewed health maintenance and updated orders accordingly - {Yes/No:471406::\"Yes\"}  {Chronicprobdata (Optional):315824}    {Mammo Decision Support (Optional):728531}    Pertinent mammograms are reviewed under the imaging tab.  History of abnormal Pap smear: {PAP HX:459374}    Reviewed and updated as needed this visit by clinical staff         Reviewed and updated as needed this visit by Provider        {HISTORY OPTIONS " "(Optional):294451}    ROS:  {FEMALE PREVENTATIVE ROS:434276}    OBJECTIVE:   There were no vitals taken for this visit.  EXAM:  {Exam Choices:942784}    ASSESSMENT/PLAN:   {Diag Picklist:778691}    COUNSELING:   {FEMALE COUNSELING MESSAGES:807230::\"Reviewed preventive health counseling, as reflected in patient instructions\"}    {BP Counseling- Complete if BP >= 120/80  (Optional):009856}     reports that she has quit smoking. She has never used smokeless tobacco.  {Tobacco Cessation -- Complete if patient is a smoker (Optional):178447}  Estimated body mass index is 31.57 kg/(m^2) as calculated from the following:    Height as of 6/22/17: 5' 10\" (1.778 m).    Weight as of 6/22/17: 220 lb (99.8 kg).   {Weight Management Plan (ACO) Complete if BMI is abnormal-  Ages 18-64  BMI >24.9.  Age 65+ with BMI <23 or >30 (Optional):144851}    Counseling Resources:  ATP IV Guidelines  Pooled Cohorts Equation Calculator  Breast Cancer Risk Calculator  FRAX Risk Assessment  ICSI Preventive Guidelines  Dietary Guidelines for Americans, 2010  USDA's MyPlate  ASA Prophylaxis  Lung CA Screening    Jt Hollis PA-C  [facility]  "

## 2017-11-17 DIAGNOSIS — Z30.011 ENCOUNTER FOR INITIAL PRESCRIPTION OF CONTRACEPTIVE PILLS: ICD-10-CM

## 2017-11-17 RX ORDER — LEVONORGESTREL/ETHIN.ESTRADIOL 0.1-0.02MG
TABLET ORAL
Qty: 84 TABLET | Refills: 0 | OUTPATIENT
Start: 2017-11-17

## 2017-11-20 LAB
COPATH REPORT: NORMAL
PAP: NORMAL

## 2017-11-22 LAB
FINAL DIAGNOSIS: NORMAL
HPV HR 12 DNA CVX QL NAA+PROBE: NEGATIVE
HPV16 DNA SPEC QL NAA+PROBE: NEGATIVE
HPV18 DNA SPEC QL NAA+PROBE: NEGATIVE
SPECIMEN DESCRIPTION: NORMAL

## 2018-05-22 DIAGNOSIS — F41.1 GAD (GENERALIZED ANXIETY DISORDER): ICD-10-CM

## 2018-05-23 RX ORDER — PAROXETINE 10 MG/1
TABLET, FILM COATED ORAL
Qty: 90 TABLET | Refills: 1 | Status: SHIPPED | OUTPATIENT
Start: 2018-05-23 | End: 2018-09-06

## 2018-09-06 ENCOUNTER — MYC MEDICAL ADVICE (OUTPATIENT)
Dept: FAMILY MEDICINE | Facility: CLINIC | Age: 48
End: 2018-09-06

## 2018-09-06 DIAGNOSIS — F41.1 GAD (GENERALIZED ANXIETY DISORDER): ICD-10-CM

## 2018-09-06 RX ORDER — PAROXETINE 10 MG/1
TABLET, FILM COATED ORAL
Qty: 14 TABLET | Refills: 0 | Status: SHIPPED | OUTPATIENT
Start: 2018-09-06 | End: 2018-09-27

## 2018-09-27 ENCOUNTER — TELEPHONE (OUTPATIENT)
Dept: FAMILY MEDICINE | Facility: CLINIC | Age: 48
End: 2018-09-27

## 2018-09-27 DIAGNOSIS — F41.1 GAD (GENERALIZED ANXIETY DISORDER): ICD-10-CM

## 2018-09-27 RX ORDER — PAROXETINE 10 MG/1
TABLET, FILM COATED ORAL
Qty: 90 TABLET | Refills: 0 | Status: SHIPPED | OUTPATIENT
Start: 2018-09-27 | End: 2018-12-18

## 2018-09-27 NOTE — TELEPHONE ENCOUNTER
I called and spoke with patient.  She states has 8 days of the [facility] prescription yet and [facility] hasn't gotten her prescription from us yet.  I let her know that we hadn't received one from them.  She doesn't have phone # or contact information.  I will try to find on line.  I got the information online.  Sent the prescription to her pharmacy.  Will need appointment before next refill.  Amaya Rosa RN

## 2018-09-27 NOTE — TELEPHONE ENCOUNTER
Patient is calling stated that the medication was never sent to [facility]. Patient was told by pharmacy that she needs to be seen. Patient is confused and would like to speak nurse.   Please call to advise  Thank you

## 2018-10-08 DIAGNOSIS — Z30.011 ENCOUNTER FOR INITIAL PRESCRIPTION OF CONTRACEPTIVE PILLS: ICD-10-CM

## 2018-10-09 RX ORDER — LEVONORGESTREL AND ETHINYL ESTRADIOL 0.1-0.02MG
KIT ORAL
Qty: 84 TABLET | Refills: 0 | Status: SHIPPED | OUTPATIENT
Start: 2018-10-09 | End: 2019-01-03

## 2018-12-18 DIAGNOSIS — F41.1 GAD (GENERALIZED ANXIETY DISORDER): ICD-10-CM

## 2018-12-20 RX ORDER — PAROXETINE 10 MG/1
TABLET, FILM COATED ORAL
Qty: 30 TABLET | Refills: 0 | Status: SHIPPED | OUTPATIENT
Start: 2018-12-20 | End: 2018-12-28

## 2018-12-20 NOTE — TELEPHONE ENCOUNTER
Medication is being filled for 1 time refill only due to:  Patient needs to be seen because it has been more than one year since last visit. anxiety and contraception.   Yohana PRINGLEN, RN

## 2018-12-28 ENCOUNTER — TELEPHONE (OUTPATIENT)
Dept: FAMILY MEDICINE | Facility: CLINIC | Age: 48
End: 2018-12-28

## 2018-12-28 DIAGNOSIS — F41.1 GAD (GENERALIZED ANXIETY DISORDER): ICD-10-CM

## 2018-12-28 RX ORDER — PAROXETINE 10 MG/1
TABLET, FILM COATED ORAL
Qty: 30 TABLET | Refills: 0 | Status: SHIPPED | OUTPATIENT
Start: 2018-12-28 | End: 2019-01-10 | Stop reason: DRUGHIGH

## 2018-12-28 NOTE — TELEPHONE ENCOUNTER
Refill request for Paroxetine.  She will be out by Monday.  Please submit to local pharmacy noted as soon as possible.

## 2019-01-10 ENCOUNTER — OFFICE VISIT (OUTPATIENT)
Dept: FAMILY MEDICINE | Facility: CLINIC | Age: 49
End: 2019-01-10
Payer: OTHER MISCELLANEOUS

## 2019-01-10 VITALS
TEMPERATURE: 99.3 F | HEIGHT: 69 IN | OXYGEN SATURATION: 99 % | SYSTOLIC BLOOD PRESSURE: 136 MMHG | HEART RATE: 69 BPM | DIASTOLIC BLOOD PRESSURE: 86 MMHG | WEIGHT: 216.8 LBS | BODY MASS INDEX: 32.11 KG/M2

## 2019-01-10 DIAGNOSIS — G90.50 RSD (REFLEX SYMPATHETIC DYSTROPHY): ICD-10-CM

## 2019-01-10 DIAGNOSIS — R53.83 FATIGUE, UNSPECIFIED TYPE: ICD-10-CM

## 2019-01-10 DIAGNOSIS — F41.1 GAD (GENERALIZED ANXIETY DISORDER): Primary | ICD-10-CM

## 2019-01-10 LAB
BASOPHILS # BLD AUTO: 0 10E9/L (ref 0–0.2)
BASOPHILS NFR BLD AUTO: 0.4 %
DIFFERENTIAL METHOD BLD: NORMAL
EOSINOPHIL # BLD AUTO: 0.5 10E9/L (ref 0–0.7)
EOSINOPHIL NFR BLD AUTO: 4.5 %
ERYTHROCYTE [DISTWIDTH] IN BLOOD BY AUTOMATED COUNT: 12.6 % (ref 10–15)
HCT VFR BLD AUTO: 38.3 % (ref 35–47)
HGB BLD-MCNC: 12.6 G/DL (ref 11.7–15.7)
LYMPHOCYTES # BLD AUTO: 4.7 10E9/L (ref 0.8–5.3)
LYMPHOCYTES NFR BLD AUTO: 44.6 %
MCH RBC QN AUTO: 30.5 PG (ref 26.5–33)
MCHC RBC AUTO-ENTMCNC: 32.9 G/DL (ref 31.5–36.5)
MCV RBC AUTO: 93 FL (ref 78–100)
MONOCYTES # BLD AUTO: 0.7 10E9/L (ref 0–1.3)
MONOCYTES NFR BLD AUTO: 6.6 %
NEUTROPHILS # BLD AUTO: 4.6 10E9/L (ref 1.6–8.3)
NEUTROPHILS NFR BLD AUTO: 43.9 %
PLATELET # BLD AUTO: 216 10E9/L (ref 150–450)
RBC # BLD AUTO: 4.13 10E12/L (ref 3.8–5.2)
WBC # BLD AUTO: 10.5 10E9/L (ref 4–11)

## 2019-01-10 PROCEDURE — 84443 ASSAY THYROID STIM HORMONE: CPT | Performed by: PHYSICIAN ASSISTANT

## 2019-01-10 PROCEDURE — 80048 BASIC METABOLIC PNL TOTAL CA: CPT | Performed by: PHYSICIAN ASSISTANT

## 2019-01-10 PROCEDURE — 36415 COLL VENOUS BLD VENIPUNCTURE: CPT | Performed by: PHYSICIAN ASSISTANT

## 2019-01-10 PROCEDURE — 85025 COMPLETE CBC W/AUTO DIFF WBC: CPT | Performed by: PHYSICIAN ASSISTANT

## 2019-01-10 PROCEDURE — 99213 OFFICE O/P EST LOW 20 MIN: CPT | Performed by: PHYSICIAN ASSISTANT

## 2019-01-10 RX ORDER — PAROXETINE 20 MG/1
20 TABLET, FILM COATED ORAL EVERY MORNING
Qty: 30 TABLET | Refills: 1 | Status: SHIPPED | OUTPATIENT
Start: 2019-01-10 | End: 2019-02-10

## 2019-01-10 ASSESSMENT — ANXIETY QUESTIONNAIRES
GAD7 TOTAL SCORE: 7
2. NOT BEING ABLE TO STOP OR CONTROL WORRYING: NOT AT ALL
3. WORRYING TOO MUCH ABOUT DIFFERENT THINGS: NOT AT ALL
1. FEELING NERVOUS, ANXIOUS, OR ON EDGE: MORE THAN HALF THE DAYS
5. BEING SO RESTLESS THAT IT IS HARD TO SIT STILL: MORE THAN HALF THE DAYS
IF YOU CHECKED OFF ANY PROBLEMS ON THIS QUESTIONNAIRE, HOW DIFFICULT HAVE THESE PROBLEMS MADE IT FOR YOU TO DO YOUR WORK, TAKE CARE OF THINGS AT HOME, OR GET ALONG WITH OTHER PEOPLE: SOMEWHAT DIFFICULT
7. FEELING AFRAID AS IF SOMETHING AWFUL MIGHT HAPPEN: NOT AT ALL
6. BECOMING EASILY ANNOYED OR IRRITABLE: MORE THAN HALF THE DAYS

## 2019-01-10 ASSESSMENT — MIFFLIN-ST. JEOR: SCORE: 1677.78

## 2019-01-10 ASSESSMENT — PATIENT HEALTH QUESTIONNAIRE - PHQ9
SUM OF ALL RESPONSES TO PHQ QUESTIONS 1-9: 13
5. POOR APPETITE OR OVEREATING: SEVERAL DAYS

## 2019-01-10 NOTE — PROGRESS NOTES
SUBJECTIVE:                                                    Omaira Smith is a 48 year old female who presents to clinic today for the following health issues:    Anxiety Follow-Up    Status since last visit: No change still awful due to her RSD and lack of pain control. Had a stimulator placed in her back and was going good for about 1 yr. Increase pain since October. Just got a new pain dr. Not sleeping well. Fatigue, run down.     Other associated symptoms:yes    Complicating factors:   Significant life event: No   Current substance abuse: Alcohol and Prescription Drugs  Depression symptoms: Yes-    JESUS-7 SCORE 1/10/2019   Total Score 7     History of Present Illness        Today's PHQ-9         PHQ-9 Total Score:         PHQ-9 Q9 Suicidal ideation:       Thoughts of suicide or self harm:      Self-harm Plan:        Self-harm Action:          Safety concerns for self or others:        JESUS-7    Amount of exercise or physical activity: none in too much pain    Problems taking medications regularly: No    Medication side effects: none    Diet: regular (no restrictions)    Problem list and histories reviewed & adjusted, as indicated.  Additional history: as documented      Patient Active Problem List   Diagnosis     Migraines     RSD (reflex sympathetic dystrophy)     CARDIOVASCULAR SCREENING; LDL GOAL LESS THAN 160     Ankle pain     Cervical high risk HPV (human papillomavirus) test positive     JESUS (generalized anxiety disorder)     Panic attack     Plantar fasciitis     BMI 30.0-30.9,adult     Past Surgical History:   Procedure Laterality Date     APPENDECTOMY  2017     BACK SURGERY  2017     CHOLECYSTECTOMY       ENT SURGERY  age 15    tonsilectomy     EYE SURGERY  age 5    Rt lazy eye repair     EYE SURGERY  age 15    Rt eye     ORTHOPEDIC SURGERY      Lt leg 5X       Social History     Tobacco Use     Smoking status: Former Smoker     Smokeless tobacco: Never Used   Substance Use Topics     Alcohol use:  "Yes     Comment: occ     Family History   Family history unknown: Yes         Current Outpatient Medications   Medication Sig Dispense Refill     Acetaminophen (TYLENOL PO) Take  by mouth 3 times daily.       LESSINA-28 0.1-20 MG-MCG tablet TAKE 1 TABLET DAILY 28 tablet 0     PARoxetine (PAXIL) 20 MG tablet Take 1 tablet (20 mg) by mouth every morning 30 tablet 1     HYDROcodone-acetaminophen (NORCO) 7.5-325 MG per tablet Take 1 tablet by mouth 3 times daily       Allergies   Allergen Reactions     Gabapentin Other (See Comments)     Irritability     Ibuprofen Other (See Comments)     LW Reaction: stomach pain     BP Readings from Last 3 Encounters:   01/10/19 136/86   11/16/17 136/86   09/21/17 136/88    Wt Readings from Last 3 Encounters:   01/10/19 98.3 kg (216 lb 12.8 oz)   11/16/17 97.1 kg (214 lb)   06/22/17 99.8 kg (220 lb)         OBJECTIVE:     /86   Pulse 69   Temp 99.3  F (37.4  C) (Oral)   Ht 1.753 m (5' 9\")   Wt 98.3 kg (216 lb 12.8 oz)   SpO2 99%   BMI 32.02 kg/m    Body mass index is 32.02 kg/m .  GENERAL: healthy, alert and no distress  Head: Normocephalic, atraumatic.  Eyes: Conjunctiva clear, non icteric. PERRLA.  Ears: External ears and TMs normal BL.  Nose: Septum midline, nasal mucosa pink and moist. No discharge.  Mouth / Throat: Normal dentition.  No oral lesions. Pharynx non erythematous, tonsils without hypertrophy.  Neck: Supple, no enlarged LN, trachea midline.  Heart: S1 and S2 normal, no murmurs, clicks, gallops or rubs. Regular rate and rhythm.  Chest: Clear; no wheezes or rales.  PSYCH: mentation appears normal, affect normal/bright    Diagnostic Test Results:  none     ASSESSMENT/PLAN:         ICD-10-CM    1. JESUS (generalized anxiety disorder) F41.1 PARoxetine (PAXIL) 20 MG tablet   2. Fatigue, unspecified type R53.83 CBC with platelets differential     TSH with free T4 reflex     Basic metabolic panel   3. RSD (reflex sympathetic dystrophy) G90.50    1. Will increase " paxil to 20mg. warning signs discussed. side effects discussed  Recheck 4 wks as needed. Otherwise in 6 months.   2. Labs pending. Suspect symptoms possible to poor sleeping due to her uncontrolled pain for the RSD.   3. con't follow up  With pain clinic.     Jt Hollis PA-C  [facility]

## 2019-01-10 NOTE — PROGRESS NOTES
History of Present Illness     Depression & Anxiety Follow-up:     Depression/Anxiety:  Depression & Anxiety    Status since last visit::  Stable    Other associated symptoms of depression and anxiety::  YES    Significant life event::  No    Current substance use::  Alcohol and Prescription Drugs       Today's PHQ-9         PHQ-9 Total Score:         PHQ-9 Q9 Suicidal ideation:       Thoughts of suicide or self harm:      Self-harm Plan:        Self-harm Action:          Safety concerns for self or others:                ROS      Physical Exam

## 2019-01-11 DIAGNOSIS — F41.1 GAD (GENERALIZED ANXIETY DISORDER): ICD-10-CM

## 2019-01-11 DIAGNOSIS — Z30.011 ENCOUNTER FOR INITIAL PRESCRIPTION OF CONTRACEPTIVE PILLS: ICD-10-CM

## 2019-01-11 LAB
ANION GAP SERPL CALCULATED.3IONS-SCNC: 5 MMOL/L (ref 3–14)
BUN SERPL-MCNC: 13 MG/DL (ref 7–30)
CALCIUM SERPL-MCNC: 9.1 MG/DL (ref 8.5–10.1)
CHLORIDE SERPL-SCNC: 105 MMOL/L (ref 94–109)
CO2 SERPL-SCNC: 29 MMOL/L (ref 20–32)
CREAT SERPL-MCNC: 0.64 MG/DL (ref 0.52–1.04)
GFR SERPL CREATININE-BSD FRML MDRD: >90 ML/MIN/{1.73_M2}
GLUCOSE SERPL-MCNC: 87 MG/DL (ref 70–99)
POTASSIUM SERPL-SCNC: 4 MMOL/L (ref 3.4–5.3)
SODIUM SERPL-SCNC: 139 MMOL/L (ref 133–144)
TSH SERPL DL<=0.005 MIU/L-ACNC: 2.06 MU/L (ref 0.4–4)

## 2019-01-11 RX ORDER — PAROXETINE 10 MG/1
TABLET, FILM COATED ORAL
Qty: 60 TABLET | Refills: 0 | Status: SHIPPED | OUTPATIENT
Start: 2019-01-11 | End: 2019-04-11

## 2019-01-11 RX ORDER — LEVONORGESTREL/ETHIN.ESTRADIOL 0.1-0.02MG
1 TABLET ORAL DAILY
Qty: 84 TABLET | Refills: 3 | Status: SHIPPED | OUTPATIENT
Start: 2019-01-11 | End: 2019-01-14

## 2019-01-11 ASSESSMENT — ANXIETY QUESTIONNAIRES: GAD7 TOTAL SCORE: 7

## 2019-01-11 NOTE — TELEPHONE ENCOUNTER
She got a letter saying she is due for her physical.  Omaira said she is on a 3 year plan and shouldn't be due.  Please call to discuss.

## 2019-01-11 NOTE — TELEPHONE ENCOUNTER
Informed patient last physical was 11/16/17, patient did have pap at that time. Informed patient not due for a pap but due for a physical  Patient states she's never had to have a physical for her birth control. Patient also stated she was just seen yesterday.  Patient asking for further refills of her birth control at this time and feel she does not need a physical    To provider to advise      Danay CHAIDEZ, RN, CPN

## 2019-02-10 DIAGNOSIS — F41.1 GAD (GENERALIZED ANXIETY DISORDER): ICD-10-CM

## 2019-02-12 RX ORDER — PAROXETINE 20 MG/1
TABLET, FILM COATED ORAL
Qty: 30 TABLET | Refills: 4 | Status: SHIPPED | OUTPATIENT
Start: 2019-02-12 | End: 2019-06-28

## 2019-03-25 DIAGNOSIS — F41.1 GAD (GENERALIZED ANXIETY DISORDER): ICD-10-CM

## 2019-03-25 RX ORDER — PAROXETINE 20 MG/1
TABLET, FILM COATED ORAL
Qty: 30 TABLET | Refills: 0 | OUTPATIENT
Start: 2019-03-25

## 2019-03-30 DIAGNOSIS — F41.1 GAD (GENERALIZED ANXIETY DISORDER): ICD-10-CM

## 2019-04-01 NOTE — TELEPHONE ENCOUNTER
Faxed received.  Message: Prev was denied but pt does not [facility]. Pt is requesting a refill.

## 2019-04-02 ENCOUNTER — TELEPHONE (OUTPATIENT)
Dept: FAMILY MEDICINE | Facility: CLINIC | Age: 49
End: 2019-04-02

## 2019-04-02 RX ORDER — PAROXETINE 20 MG/1
TABLET, FILM COATED ORAL
Qty: 30 TABLET | Refills: 0 | OUTPATIENT
Start: 2019-04-02

## 2019-04-02 NOTE — TELEPHONE ENCOUNTER
Spoke with pharmacy.   Did confirm with pharmacy we sent prescription on 2/12 for 30 day with 4 refills  They received prescription on 2/12 but there were no refills entered.   Pharmacy will add refills to prescription    Danay CHAIDEZ, RN, CPN

## 2019-04-02 NOTE — TELEPHONE ENCOUNTER
Prior Authorization Retail Medication Request    Medication/Dose:   ICD code (if different than what is on RX):    Previously Tried and Failed:    Rationale:      Insurance Name:  [facility]  Insurance ID:  [id number]      Pharmacy Information (if different than what is on RX)  Name:  [facility]   Phone:  [phone]

## 2019-04-08 NOTE — TELEPHONE ENCOUNTER
Prior Authorization Not Needed per Insurance    Medication: PARoxetine (PAXIL) 20 MG tablet  Insurance Company: [employer] ([facility]) - Phone [phone] Fax [phone]  Expected CoPay:      Pharmacy Filling the Rx: [facility] [place] [address] AT [facility] [place]  Pharmacy Notified:  Yes  Patient Notified:  Yes

## 2019-04-11 ENCOUNTER — OFFICE VISIT (OUTPATIENT)
Dept: FAMILY MEDICINE | Facility: CLINIC | Age: 49
End: 2019-04-11
Payer: OTHER MISCELLANEOUS

## 2019-04-11 VITALS
RESPIRATION RATE: 18 BRPM | DIASTOLIC BLOOD PRESSURE: 83 MMHG | WEIGHT: 217 LBS | TEMPERATURE: 98.3 F | SYSTOLIC BLOOD PRESSURE: 133 MMHG | BODY MASS INDEX: 32.05 KG/M2 | OXYGEN SATURATION: 97 % | HEART RATE: 79 BPM

## 2019-04-11 DIAGNOSIS — Z01.818 PREOP GENERAL PHYSICAL EXAM: Primary | ICD-10-CM

## 2019-04-11 DIAGNOSIS — G90.50 RSD (REFLEX SYMPATHETIC DYSTROPHY): ICD-10-CM

## 2019-04-11 LAB
ANION GAP SERPL CALCULATED.3IONS-SCNC: 9 MMOL/L (ref 3–14)
BASOPHILS # BLD AUTO: 0.1 10E9/L (ref 0–0.2)
BASOPHILS NFR BLD AUTO: 0.6 %
BUN SERPL-MCNC: 15 MG/DL (ref 7–30)
CALCIUM SERPL-MCNC: 9.2 MG/DL (ref 8.5–10.1)
CHLORIDE SERPL-SCNC: 108 MMOL/L (ref 94–109)
CO2 SERPL-SCNC: 23 MMOL/L (ref 20–32)
CREAT SERPL-MCNC: 0.74 MG/DL (ref 0.52–1.04)
DIFFERENTIAL METHOD BLD: NORMAL
EOSINOPHIL # BLD AUTO: 0.5 10E9/L (ref 0–0.7)
EOSINOPHIL NFR BLD AUTO: 4.8 %
ERYTHROCYTE [DISTWIDTH] IN BLOOD BY AUTOMATED COUNT: 12.7 % (ref 10–15)
GFR SERPL CREATININE-BSD FRML MDRD: >90 ML/MIN/{1.73_M2}
GLUCOSE SERPL-MCNC: 85 MG/DL (ref 70–99)
HCT VFR BLD AUTO: 38.9 % (ref 35–47)
HGB BLD-MCNC: 12.8 G/DL (ref 11.7–15.7)
LYMPHOCYTES # BLD AUTO: 4 10E9/L (ref 0.8–5.3)
LYMPHOCYTES NFR BLD AUTO: 40.4 %
MCH RBC QN AUTO: 30.3 PG (ref 26.5–33)
MCHC RBC AUTO-ENTMCNC: 32.9 G/DL (ref 31.5–36.5)
MCV RBC AUTO: 92 FL (ref 78–100)
MONOCYTES # BLD AUTO: 0.5 10E9/L (ref 0–1.3)
MONOCYTES NFR BLD AUTO: 5.2 %
NEUTROPHILS # BLD AUTO: 4.9 10E9/L (ref 1.6–8.3)
NEUTROPHILS NFR BLD AUTO: 49 %
PLATELET # BLD AUTO: 210 10E9/L (ref 150–450)
POTASSIUM SERPL-SCNC: 4.1 MMOL/L (ref 3.4–5.3)
RBC # BLD AUTO: 4.23 10E12/L (ref 3.8–5.2)
SODIUM SERPL-SCNC: 140 MMOL/L (ref 133–144)
WBC # BLD AUTO: 10 10E9/L (ref 4–11)

## 2019-04-11 PROCEDURE — 36415 COLL VENOUS BLD VENIPUNCTURE: CPT | Performed by: PHYSICIAN ASSISTANT

## 2019-04-11 PROCEDURE — 85025 COMPLETE CBC W/AUTO DIFF WBC: CPT | Performed by: PHYSICIAN ASSISTANT

## 2019-04-11 PROCEDURE — 80048 BASIC METABOLIC PNL TOTAL CA: CPT | Performed by: PHYSICIAN ASSISTANT

## 2019-04-11 PROCEDURE — 99214 OFFICE O/P EST MOD 30 MIN: CPT | Performed by: PHYSICIAN ASSISTANT

## 2019-04-11 RX ORDER — ZONISAMIDE 25 MG/1
CAPSULE ORAL
Refills: 3 | COMMUNITY
Start: 2019-03-30 | End: 2019-07-23

## 2019-04-11 NOTE — PROGRESS NOTES
[facility]  [address] [address] [phone]  Dept: [phone]    PRE-OP EVALUATION:  Today's date: 2019    Omaira Smith (: 1970) presents for pre-operative evaluation assessment as requested by Dr. Hernandez.  She requires evaluation and anesthesia risk assessment prior to undergoing surgery/procedure for treatment of back/ left leg pain with stimulator  .    Fax number for surgical facility: [phone]  Primary Physician: Jt Hollis  Type of Anesthesia Anticipated: General    Patient has a Health Care Directive or Living Will:  NO    Preop Questions 2019   Who is doing your surgery? Dr Hernandez   What are you having done? [facility]  implant   Date of Surgery/Procedure: 2019   Facility or Hospital where procedure/surgery will be performed: [facility]   1.  Do you have a history of Heart attack, stroke, stent, coronary bypass surgery, or other heart surgery? No   2.  Do you ever have any pain or discomfort in your chest? No   3.  Do you have a history of  Heart Failure? No   4.   Are you troubled by shortness of breath when:  walking on a level surface, or up a slight hill, or at night? No   5.  Do you currently have a cold, bronchitis or other respiratory infection? No   6.  Do you have a cough, shortness of breath, or wheezing? No   7.  Do you sometimes get pains in the calves of your legs when you walk? No   8. Do you or anyone in your family have previous history of blood clots? No   9.  Do you or does anyone in your family have a serious bleeding problem such as prolonged bleeding following surgeries or cuts? No   10. Have you ever had problems with anemia or been told to take iron pills? No   11. Have you had any abnormal blood loss such as black, tarry or bloody stools, or abnormal vaginal bleeding? No   12. Have you ever had a blood transfusion? No   13. Have you or any of your relatives ever had problems with  anesthesia? No   14. Do you have sleep apnea, excessive snoring or daytime drowsiness? No   15. Do you have any prosthetic heart valves? No   16. Do you have prosthetic joints? No   17. Is there any chance that you may be pregnant? No         HPI:     HPI related to upcoming procedure: history of RSD left leg and lower back    See problem list for active medical problems.  Problems all longstanding and stable, except as noted/documented.  See ROS for pertinent symptoms related to these conditions.                                                                                                                                                          .    MEDICAL HISTORY:     Patient Active Problem List    Diagnosis Date Noted     BMI 30.0-30.9,adult 11/16/2017     Priority: Medium     Plantar fasciitis 06/22/2017     Priority: Medium     JESUS (generalized anxiety disorder) 12/07/2016     Priority: Medium     Panic attack 12/07/2016     Priority: Medium     Cervical high risk HPV (human papillomavirus) test positive 03/06/2015     Priority: Medium     3/6/15: Pap - NIL, + HR HPV 18. Plan colp  4/28/15: [place] - normal with no visible lesions. No biopsies taken. Plan cotest in 1 year.   3/9/16: pap - NIL, + HR HPV 18. Plan colp  7/13/16 [place]: Your recent pathology report was negative for abnormal cells so no treatment is advised at this time. Plan: cotest in 1 year.   11/16/17 NIL Pap, Neg HPV. Plan cotest in 3 years.        Ankle pain 06/11/2014     Priority: Medium     CARDIOVASCULAR SCREENING; LDL GOAL LESS THAN 160 07/11/2012     Priority: Medium     Migraines      Priority: Medium     RSD (reflex sympathetic dystrophy)      Priority: Medium      Past Medical History:   Diagnosis Date     Anxiety      Cervical high risk HPV (human papillomavirus) test positive 3/2015, 3/2016    type 18     JESUS (generalized anxiety disorder) 12/7/2016     Migraines      RSD (reflex sympathetic dystrophy)      Past Surgical History:    Procedure Laterality Date     APPENDECTOMY  2017     BACK SURGERY  2017     CHOLECYSTECTOMY       ENT SURGERY  age 15    tonsilectomy     EYE SURGERY  age 5    Rt lazy eye repair     EYE SURGERY  age 15    Rt eye     ORTHOPEDIC SURGERY      Lt leg 5X     Current Outpatient Medications   Medication Sig Dispense Refill     Acetaminophen (TYLENOL PO) Take  by mouth 3 times daily.       HYDROcodone-acetaminophen (NORCO) 7.5-325 MG per tablet Take 1 tablet by mouth 3 times daily       levonorgestrel-ethinyl estradiol (LESSINA-28) 0.1-20 MG-MCG tablet Take 1 tablet by mouth daily 84 tablet 3     PARoxetine (PAXIL) 20 MG tablet TAKE 1 TABLET(20 MG) BY MOUTH EVERY MORNING 30 tablet 4     zonisamide (ZONEGRAN) 25 MG capsule START WITH 1 CAPSULE BY MOUTH EVERY NIGHT AT BEDTIME FOR 7 DAYS AND TITRATE BY 1 CAPSULE PER WEEK UNTIL TAKING 2 CAPSULES BID  3     OTC products: None, except as noted above    Allergies   Allergen Reactions     Gabapentin Other (See Comments)     Irritability     Ibuprofen Other (See Comments)     LW Reaction: stomach pain      Latex Allergy: NO    Social History     Tobacco Use     Smoking status: Former Smoker     Smokeless tobacco: Never Used   Substance Use Topics     Alcohol use: Yes     Comment: occ     History   Drug Use No       REVIEW OF SYSTEMS:   CONSTITUTIONAL: NEGATIVE for fever, chills, change in weight  INTEGUMENTARY/SKIN: NEGATIVE for worrisome rashes, moles or lesions  EYES: NEGATIVE for vision changes or irritation  ENT/MOUTH: NEGATIVE for ear, mouth and throat problems  RESP: NEGATIVE for significant cough or SOB  CV: NEGATIVE for chest pain, palpitations or peripheral edema  GI: NEGATIVE for nausea, abdominal pain, heartburn, or change in bowel habits  : NEGATIVE for frequency, dysuria, or hematuria  MUSCULOSKELETAL: NEGATIVE for significant arthralgias or myalgia  NEURO: NEGATIVE for weakness, dizziness or paresthesias  ENDOCRINE: NEGATIVE for temperature intolerance, skin/hair  changes  HEME: NEGATIVE for bleeding problems  PSYCHIATRIC: NEGATIVE for changes in mood or affect    EXAM:   /83   Pulse 79   Temp 98.3  F (36.8  C) (Oral)   Resp 18   Wt 98.4 kg (217 lb)   SpO2 97%   BMI 32.05 kg/m      GENERAL APPEARANCE: healthy, alert and no distress     EYES: EOMI, PERRL     HENT: ear canals and TM's normal and nose and mouth without ulcers or lesions     NECK: no adenopathy, no asymmetry, masses, or scars and thyroid normal to palpation     RESP: lungs clear to auscultation - no rales, rhonchi or wheezes     CV: regular rates and rhythm, normal S1 S2, no S3 or S4 and no murmur, click or rub     ABDOMEN:  soft, nontender, no HSM or masses and bowel sounds normal     MS: extremities normal- no gross deformities noted, no evidence of inflammation in joints, FROM in all extremities.     SKIN: no suspicious lesions or rashes     NEURO: Normal strength and tone, sensory exam grossly normal, mentation intact and speech normal     PSYCH: mentation appears normal. and affect normal/bright     LYMPHATICS: No cervical adenopathy    DIAGNOSTICS:     Labs Drawn and in Process:   Unresulted Labs Ordered in the Past 30 Days of this Admission     Date and Time Order Name Status Description    4/11/2019 1659 BASIC METABOLIC PANEL In process           Recent Labs   Lab Test 01/10/19  1754 11/09/17  0722 09/21/17  1254   HGB 12.6  --  12.4     --  189    139 135   POTASSIUM 4.0 4.4 4.1   CR 0.64 0.67 0.67      IMPRESSION:   Reason for surgery/procedure: treatment of back/ left leg pain with stimulator    The proposed surgical procedure is considered LOW risk.    REVISED CARDIAC RISK INDEX  The patient has the following serious cardiovascular risks for perioperative complications such as (MI, PE, VFib and 3  AV Block):  No serious cardiac risks  INTERPRETATION: 0 risks: Class I (very low risk - 0.4% complication rate)    The patient has the following additional risks for perioperative  complications:  No identified additional risks      ICD-10-CM    1. Preop general physical exam Z01.818 CBC with platelets differential     Basic metabolic panel   2. RSD (reflex sympathetic dystrophy) G90.50        RECOMMENDATIONS:       APPROVAL GIVEN to proceed with proposed procedure, without further diagnostic evaluation       Signed Electronically by: Jt Hollis PA-C    Copy of this evaluation report is provided to requesting physician.    [facility] Preop Guidelines    Revised Cardiac Risk Index  I agree with the above plan  Lane Mccartney MD

## 2019-06-28 ENCOUNTER — MYC REFILL (OUTPATIENT)
Dept: FAMILY MEDICINE | Facility: CLINIC | Age: 49
End: 2019-06-28

## 2019-06-28 DIAGNOSIS — F41.1 GAD (GENERALIZED ANXIETY DISORDER): ICD-10-CM

## 2019-06-28 RX ORDER — PAROXETINE 20 MG/1
TABLET, FILM COATED ORAL
Qty: 30 TABLET | Refills: 0 | Status: SHIPPED | OUTPATIENT
Start: 2019-06-28 | End: 2019-07-23 | Stop reason: DRUGHIGH

## 2019-07-23 ENCOUNTER — OFFICE VISIT (OUTPATIENT)
Dept: FAMILY MEDICINE | Facility: CLINIC | Age: 49
End: 2019-07-23
Payer: OTHER MISCELLANEOUS

## 2019-07-23 VITALS
HEIGHT: 69 IN | HEART RATE: 83 BPM | BODY MASS INDEX: 30.96 KG/M2 | DIASTOLIC BLOOD PRESSURE: 82 MMHG | TEMPERATURE: 98.9 F | WEIGHT: 209 LBS | SYSTOLIC BLOOD PRESSURE: 132 MMHG | RESPIRATION RATE: 18 BRPM | OXYGEN SATURATION: 99 %

## 2019-07-23 DIAGNOSIS — G90.50 RSD (REFLEX SYMPATHETIC DYSTROPHY): ICD-10-CM

## 2019-07-23 DIAGNOSIS — Z13.1 SCREENING FOR DIABETES MELLITUS: ICD-10-CM

## 2019-07-23 DIAGNOSIS — F41.1 GAD (GENERALIZED ANXIETY DISORDER): ICD-10-CM

## 2019-07-23 DIAGNOSIS — Z01.818 PREOP GENERAL PHYSICAL EXAM: Primary | ICD-10-CM

## 2019-07-23 DIAGNOSIS — Z13.220 SCREENING CHOLESTEROL LEVEL: ICD-10-CM

## 2019-07-23 LAB — HGB BLD-MCNC: 13.1 G/DL (ref 11.7–15.7)

## 2019-07-23 PROCEDURE — 36415 COLL VENOUS BLD VENIPUNCTURE: CPT | Performed by: PHYSICIAN ASSISTANT

## 2019-07-23 PROCEDURE — 85018 HEMOGLOBIN: CPT | Performed by: PHYSICIAN ASSISTANT

## 2019-07-23 PROCEDURE — 99214 OFFICE O/P EST MOD 30 MIN: CPT | Performed by: PHYSICIAN ASSISTANT

## 2019-07-23 RX ORDER — PAROXETINE 10 MG/1
20 TABLET, FILM COATED ORAL EVERY MORNING
Qty: 90 TABLET | Refills: 1 | Status: SHIPPED | OUTPATIENT
Start: 2019-07-23 | End: 2019-10-22

## 2019-07-23 ASSESSMENT — MIFFLIN-ST. JEOR: SCORE: 1637.4

## 2019-07-23 NOTE — PROGRESS NOTES
[facility]  [address] [facility] [address]  [phone]  Dept: [phone]    PRE-OP EVALUATION:  Today's date: 2019    Omaira Smith (: 1970) presents for pre-operative evaluation assessment as requested by Dr. Butler.  She requires evaluation and anesthesia risk assessment prior to undergoing surgery/procedure for treatment of removal of stimulator for history of RSD.     Fax number for surgical facility:   Primary Physician: Jt Hollis  Type of Anesthesia Anticipated: General    Patient has a Health Care Directive or Living Will:  NO    Preop Questions 2019   Who is doing your surgery? Dr. Butler   What are you having done? [facility] xplant   Date of Surgery/Procedure: 2019   Facility or Hospital where procedure/surgery will be performed: [facility]   1.  Do you have a history of Heart attack, stroke, stent, coronary bypass surgery, or other heart surgery? No   2.  Do you ever have any pain or discomfort in your chest? No   3.  Do you have a history of  Heart Failure? No   4.   Are you troubled by shortness of breath when:  walking on a level surface, or up a slight hill, or at night? No   5.  Do you currently have a cold, bronchitis or other respiratory infection? No   6.  Do you have a cough, shortness of breath, or wheezing? No   7.  Do you sometimes get pains in the calves of your legs when you walk? No   8. Do you or anyone in your family have previous history of blood clots? No   9.  Do you or does anyone in your family have a serious bleeding problem such as prolonged bleeding following surgeries or cuts? No   10. Have you ever had problems with anemia or been told to take iron pills? No   11. Have you had any abnormal blood loss such as black, tarry or bloody stools, or abnormal vaginal bleeding? No   12. Have you ever had a blood transfusion? No   13. Have you or any of your relatives ever had problems with anesthesia? No   14. Do you have  sleep apnea, excessive snoring or daytime drowsiness? No   15. Do you have any prosthetic heart valves? No   16. Do you have prosthetic joints? No   17. Is there any chance that you may be pregnant? No         HPI:     HPI related to upcoming procedure:  history of RSD left leg and lower back pain.   Has a new stimular placed and working well   See problem list for active medical problems.  Problems all longstanding and stable, except as noted/documented.  See ROS for pertinent symptoms related to these conditions.      MEDICAL HISTORY:     Patient Active Problem List    Diagnosis Date Noted     BMI 30.0-30.9,adult 11/16/2017     Priority: Medium     Plantar fasciitis 06/22/2017     Priority: Medium     JESUS (generalized anxiety disorder) 12/07/2016     Priority: Medium     Panic attack 12/07/2016     Priority: Medium     Cervical high risk HPV (human papillomavirus) test positive 03/06/2015     Priority: Medium     3/6/15: Pap - NIL, + HR HPV 18. Plan colp  4/28/15: [place] - normal with no visible lesions. No biopsies taken. Plan cotest in 1 year.   3/9/16: pap - NIL, + HR HPV 18. Plan colp  7/13/16 [place]: Your recent pathology report was negative for abnormal cells so no treatment is advised at this time. Plan: cotest in 1 year.   11/16/17 NIL Pap, Neg HPV. Plan cotest in 3 years.        Ankle pain 06/11/2014     Priority: Medium     CARDIOVASCULAR SCREENING; LDL GOAL LESS THAN 160 07/11/2012     Priority: Medium     Migraines      Priority: Medium     RSD (reflex sympathetic dystrophy)      Priority: Medium      Past Medical History:   Diagnosis Date     Anxiety      Cervical high risk HPV (human papillomavirus) test positive 3/2015, 3/2016    type 18     JESUS (generalized anxiety disorder) 12/7/2016     Migraines      RSD (reflex sympathetic dystrophy)      Past Surgical History:   Procedure Laterality Date     APPENDECTOMY  2017     BACK SURGERY  2017     CHOLECYSTECTOMY       ENT SURGERY  age 15    tonsilectomy      "EYE SURGERY  age 5    Rt lazy eye repair     EYE SURGERY  age 15    Rt eye     ORTHOPEDIC SURGERY      Lt leg 5X     Current Outpatient Medications   Medication Sig Dispense Refill     Acetaminophen (TYLENOL PO) Take  by mouth 3 times daily.       levonorgestrel-ethinyl estradiol (LESSINA-28) 0.1-20 MG-MCG tablet Take 1 tablet by mouth daily 84 tablet 3     PARoxetine (PAXIL) 10 MG tablet Take 2 tablets (20 mg) by mouth every morning 90 tablet 1     OTC products: None, except as noted above    Allergies   Allergen Reactions     Gabapentin Other (See Comments)     Irritability     Ibuprofen Other (See Comments)     LW Reaction: stomach pain      Latex Allergy: NO    Social History     Tobacco Use     Smoking status: Former Smoker     Smokeless tobacco: Never Used   Substance Use Topics     Alcohol use: Yes     Comment: occ     History   Drug Use No       REVIEW OF SYSTEMS:   CONSTITUTIONAL: NEGATIVE for fever, chills, change in weight  INTEGUMENTARY/SKIN: NEGATIVE for worrisome rashes, moles or lesions  EYES: NEGATIVE for vision changes or irritation  ENT/MOUTH: NEGATIVE for ear, mouth and throat problems  RESP: NEGATIVE for significant cough or SOB  CV: NEGATIVE for chest pain, palpitations or peripheral edema  GI: NEGATIVE for nausea, abdominal pain, heartburn, or change in bowel habits  : NEGATIVE for frequency, dysuria, or hematuria  MUSCULOSKELETAL: NEGATIVE for significant arthralgias or myalgia  NEURO: NEGATIVE for weakness, dizziness or paresthesias  ENDOCRINE: NEGATIVE for temperature intolerance, skin/hair changes  HEME: NEGATIVE for bleeding problems  PSYCHIATRIC: NEGATIVE for changes in mood or affect    EXAM:   /82   Pulse 83   Temp 98.9  F (37.2  C) (Oral)   Resp 18   Ht 1.753 m (5' 9\")   Wt 94.8 kg (209 lb)   SpO2 99%   BMI 30.86 kg/m      GENERAL APPEARANCE: healthy, alert and no distress     EYES: EOMI, PERRL     HENT: ear canals and TM's normal and nose and mouth without ulcers or " lesions     NECK: no adenopathy, no asymmetry, masses, or scars and thyroid normal to palpation     RESP: lungs clear to auscultation - no rales, rhonchi or wheezes     CV: regular rates and rhythm, normal S1 S2, no S3 or S4 and no murmur, click or rub     ABDOMEN:  soft, nontender, no HSM or masses and bowel sounds normal     MS: extremities normal- no gross deformities noted, no evidence of inflammation in joints, FROM in all extremities.     SKIN: no suspicious lesions or rashes     NEURO: Normal strength and tone, sensory exam grossly normal, mentation intact and speech normal     PSYCH: mentation appears normal. and affect normal/bright     LYMPHATICS: No cervical adenopathy    DIAGNOSTICS:     Labs Drawn and in Process:   Unresulted Labs Ordered in the Past 30 Days of this Admission     No orders found from 6/23/2019 to 7/24/2019.          Recent Labs   Lab Test 04/11/19  1708 01/10/19  1754   HGB 12.8 12.6    216    139   POTASSIUM 4.1 4.0   CR 0.74 0.64        IMPRESSION:   Reason for surgery/procedure: back stimulator removal.     The proposed surgical procedure is considered LOW risk.    REVISED CARDIAC RISK INDEX  The patient has the following serious cardiovascular risks for perioperative complications such as (MI, PE, VFib and 3  AV Block):  No serious cardiac risks  INTERPRETATION: 0 risks: Class I (very low risk - 0.4% complication rate)    The patient has the following additional risks for perioperative complications:  No identified additional risks      ICD-10-CM    1. Preop general physical exam Z01.818 Hemoglobin   2. RSD (reflex sympathetic dystrophy) G90.50    3. JESUS (generalized anxiety disorder) F41.1 PARoxetine (PAXIL) 10 MG tablet   4. Screening cholesterol level Z13.220 Lipid panel reflex to direct LDL Fasting   5. Screening for diabetes mellitus Z13.1 Basic metabolic panel       RECOMMENDATIONS:     APPROVAL GIVEN to proceed with proposed procedure, without further diagnostic  evaluation   3. Will decrease paxil at patient request. Recheck 6 months.     Signed Electronically by: Jt Hollis PA-C  Discussed this patient with Jt Hollis and agree with above assessment and plan. The patient is an acceptable candidate for the proposed anasthesia.  Alvin Aldrich MD      Copy of this evaluation report is provided to requesting physician.    [place] Preop Guidelines    Revised Cardiac Risk Index

## 2019-07-24 ENCOUNTER — MYC MEDICAL ADVICE (OUTPATIENT)
Dept: FAMILY MEDICINE | Facility: CLINIC | Age: 49
End: 2019-07-24

## 2019-07-24 NOTE — PROGRESS NOTES
I spoke to Irlanda at Dr. Butler's office and I faxed the Pre Op to them at [phone] so they have it.  Innography message sent to the patient.  She does not need to bring a copy of the Pre Op.  Elina Riley,

## 2019-07-24 NOTE — TELEPHONE ENCOUNTER
Patient is calling again. She states that her surgery is tomorrow so she would like this faxed ASAP.    Thank you

## 2019-09-16 ENCOUNTER — TELEPHONE (OUTPATIENT)
Dept: FAMILY MEDICINE | Facility: CLINIC | Age: 49
End: 2019-09-16

## 2019-09-16 NOTE — TELEPHONE ENCOUNTER
Patient is scheduled on 9/19/19 with Jt Hollis PA-C.  This appointment needs to be rescheduled as the provider is going to be unavailable.  Patient was contacted by: Phone. Left 2 messages for patient to call and reschedule appointment.  Elina Riley,

## 2019-09-17 NOTE — PROGRESS NOTES
[facility]  [address] [place] [id number]  [phone]  Dept: [phone]    PRE-OP EVALUATION:  Today's date: 2019    Omaira Smith (: 1970) presents for pre-operative evaluation assessment as requested by Dr. Camejo.  She requires evaluation and anesthesia risk assessment prior to undergoing surgery/procedure for treatment of SPINAL CORD STIMULATOR PADDLE LEAD EXPLANT.    Fax number for surgical facility: [phone]  Primary Physician: Jt Hollis  Type of Anesthesia Anticipated: to be determined    Patient has a Health Care Directive or Living Will:  NO    Preop Questions 2019   Who is doing your surgery? dr. camejo   What are you having done? scs revision   Date of Surgery/Procedure: 2019   Facility or Hospital where procedure/surgery will be performed: [facility]   1.  Do you have a history of Heart attack, stroke, stent, coronary bypass surgery, or other heart surgery? No   2.  Do you ever have any pain or discomfort in your chest? No   3.  Do you have a history of  Heart Failure? No   4.   Are you troubled by shortness of breath when:  walking on a level surface, or up a slight hill, or at night? No   5.  Do you currently have a cold, bronchitis or other respiratory infection? No   6.  Do you have a cough, shortness of breath, or wheezing? No   7.  Do you sometimes get pains in the calves of your legs when you walk? No   8. Do you or anyone in your family have previous history of blood clots? No   9.  Do you or does anyone in your family have a serious bleeding problem such as prolonged bleeding following surgeries or cuts? No   10. Have you ever had problems with anemia or been told to take iron pills? No   11. Have you had any abnormal blood loss such as black, tarry or bloody stools, or abnormal vaginal bleeding? No   12. Have you ever had a blood transfusion? No   13. Have you or any of your relatives ever had problems with anesthesia?  No   14. Do you have sleep apnea, excessive snoring or daytime drowsiness? No   15. Do you have any prosthetic heart valves? No   16. Do you have prosthetic joints? No   17. Is there any chance that you may be pregnant? No         HPI:     HPI related to upcoming procedure: RSD since 2003.        See problem list for active medical problems.  Problems all longstanding and stable, except as noted/documented.  See ROS for pertinent symptoms related to these conditions.      MEDICAL HISTORY:     Patient Active Problem List    Diagnosis Date Noted     BMI 30.0-30.9,adult 11/16/2017     Priority: Medium     Plantar fasciitis 06/22/2017     Priority: Medium     JESUS (generalized anxiety disorder) 12/07/2016     Priority: Medium     Panic attack 12/07/2016     Priority: Medium     Cervical high risk HPV (human papillomavirus) test positive 03/06/2015     Priority: Medium     3/6/15: Pap - NIL, + HR HPV 18. Plan colp  4/28/15: [facility] - normal with no visible lesions. No biopsies taken. Plan cotest in 1 year.   3/9/16: pap - NIL, + HR HPV 18. Plan colp  7/13/16 [facility]: Your recent pathology report was negative for abnormal cells so no treatment is advised at this time. Plan: cotest in 1 year.   11/16/17 NIL Pap, Neg HPV. Plan cotest in 3 years.        Ankle pain 06/11/2014     Priority: Medium     CARDIOVASCULAR SCREENING; LDL GOAL LESS THAN 160 07/11/2012     Priority: Medium     Migraines      Priority: Medium     RSD (reflex sympathetic dystrophy)      Priority: Medium      Past Medical History:   Diagnosis Date     Anxiety      Cervical high risk HPV (human papillomavirus) test positive 3/2015, 3/2016    type 18     JESUS (generalized anxiety disorder) 12/7/2016     Migraines      RSD (reflex sympathetic dystrophy)      Past Surgical History:   Procedure Laterality Date     APPENDECTOMY  2017     BACK SURGERY  2017     CHOLECYSTECTOMY       ENT SURGERY  age 15    tonsilectomy     EYE SURGERY  age 5    Rt lazy eye repair     EYE  SURGERY  age 15    Rt eye     ORTHOPEDIC SURGERY      Lt leg 5X     Current Outpatient Medications   Medication Sig Dispense Refill     Acetaminophen (TYLENOL PO) Take  by mouth 3 times daily.       levonorgestrel-ethinyl estradiol (LESSINA-28) 0.1-20 MG-MCG tablet Take 1 tablet by mouth daily 84 tablet 3     PARoxetine (PAXIL) 10 MG tablet Take 2 tablets (20 mg) by mouth every morning 90 tablet 1     OTC products: None, except as noted above    Allergies   Allergen Reactions     Gabapentin Other (See Comments)     Irritability     Ibuprofen Other (See Comments)     LW Reaction: stomach pain      Latex Allergy: NO    Social History     Tobacco Use     Smoking status: Former Smoker     Smokeless tobacco: Never Used   Substance Use Topics     Alcohol use: Yes     Comment: occ     History   Drug Use No       REVIEW OF SYSTEMS:   CONSTITUTIONAL: NEGATIVE for fever, chills, change in weight  INTEGUMENTARY/SKIN: NEGATIVE for worrisome rashes, moles or lesions  EYES: NEGATIVE for vision changes or irritation  ENT/MOUTH: NEGATIVE for ear, mouth and throat problems  RESP: NEGATIVE for significant cough or SOB  BREAST: NEGATIVE for masses, tenderness or discharge  CV: NEGATIVE for chest pain, palpitations or peripheral edema  GI: NEGATIVE for nausea, abdominal pain, heartburn, or change in bowel habits  : NEGATIVE for frequency, dysuria, or hematuria  MUSCULOSKELETAL: NEGATIVE for significant arthralgias or myalgia  NEURO: NEGATIVE for weakness, dizziness or paresthesias  ENDOCRINE: NEGATIVE for temperature intolerance, skin/hair changes  HEME: NEGATIVE for bleeding problems  PSYCHIATRIC: NEGATIVE for changes in mood or affect    EXAM:   /74   Pulse 78   Temp 99.2  F (37.3  C) (Oral)   Resp 18   Wt 94.8 kg (209 lb)   SpO2 99%   BMI 30.86 kg/m      GENERAL APPEARANCE: healthy, alert and no distress     EYES: EOMI, PERRL     HENT: ear canals and TM's normal and nose and mouth without ulcers or lesions     NECK: no  adenopathy, no asymmetry, masses, or scars and thyroid normal to palpation     RESP: lungs clear to auscultation - no rales, rhonchi or wheezes     CV: regular rates and rhythm, normal S1 S2, no S3 or S4 and no murmur, click or rub     ABDOMEN:  soft, nontender, no HSM or masses and bowel sounds normal     MS: extremities normal- no gross deformities noted, no evidence of inflammation in joints, FROM in all extremities.     SKIN: no suspicious lesions or rashes     NEURO: Normal strength and tone, sensory exam grossly normal, mentation intact and speech normal     PSYCH: mentation appears normal. and affect normal/bright     LYMPHATICS: No cervical adenopathy    DIAGNOSTICS:   EKG: Not indicated due to non-vascular surgery and low risk of event (age <65 and without cardiac risk factors)    Recent Labs   Lab Test 07/23/19  0814 04/11/19  1708 01/10/19  1754   HGB 13.1 12.8 12.6   PLT  --  210 216   NA  --  140 139   POTASSIUM  --  4.1 4.0   CR  --  0.74 0.64        IMPRESSION:   The proposed surgical procedure is considered INTERMEDIATE risk.    REVISED CARDIAC RISK INDEX  The patient has the following serious cardiovascular risks for perioperative complications such as (MI, PE, VFib and 3  AV Block):  No serious cardiac risks  INTERPRETATION: 1 risks: Class II (low risk - 0.9% complication rate)    The patient has the following additional risks for perioperative complications:        ICD-10-CM    1. Preop general physical exam Z01.818    2. RSD (reflex sympathetic dystrophy) G90.50        RECOMMENDATIONS:       APPROVAL GIVEN to proceed with proposed procedure, without further diagnostic evaluation       Signed Electronically by: Gene Nj PA-C    Copy of this evaluation report is provided to requesting physician.    [facility] Preop Guidelines    Revised Cardiac Risk Index

## 2019-09-18 ENCOUNTER — OFFICE VISIT (OUTPATIENT)
Dept: FAMILY MEDICINE | Facility: CLINIC | Age: 49
End: 2019-09-18
Payer: OTHER MISCELLANEOUS

## 2019-09-18 VITALS
SYSTOLIC BLOOD PRESSURE: 134 MMHG | HEART RATE: 78 BPM | OXYGEN SATURATION: 99 % | DIASTOLIC BLOOD PRESSURE: 74 MMHG | BODY MASS INDEX: 30.86 KG/M2 | RESPIRATION RATE: 18 BRPM | WEIGHT: 209 LBS | TEMPERATURE: 99.2 F

## 2019-09-18 DIAGNOSIS — Z01.818 PREOP GENERAL PHYSICAL EXAM: Primary | ICD-10-CM

## 2019-09-18 DIAGNOSIS — G90.50 RSD (REFLEX SYMPATHETIC DYSTROPHY): ICD-10-CM

## 2019-09-18 PROCEDURE — 99214 OFFICE O/P EST MOD 30 MIN: CPT | Performed by: PHYSICIAN ASSISTANT

## 2019-09-18 ASSESSMENT — PATIENT HEALTH QUESTIONNAIRE - PHQ9: SUM OF ALL RESPONSES TO PHQ QUESTIONS 1-9: 2

## 2019-09-19 DIAGNOSIS — Z13.220 SCREENING CHOLESTEROL LEVEL: ICD-10-CM

## 2019-09-19 DIAGNOSIS — Z13.1 SCREENING FOR DIABETES MELLITUS: ICD-10-CM

## 2019-09-19 LAB
ANION GAP SERPL CALCULATED.3IONS-SCNC: 3 MMOL/L (ref 3–14)
BUN SERPL-MCNC: 17 MG/DL (ref 7–30)
CALCIUM SERPL-MCNC: 9.3 MG/DL (ref 8.5–10.1)
CHLORIDE SERPL-SCNC: 107 MMOL/L (ref 94–109)
CHOLEST SERPL-MCNC: 216 MG/DL
CO2 SERPL-SCNC: 30 MMOL/L (ref 20–32)
CREAT SERPL-MCNC: 0.74 MG/DL (ref 0.52–1.04)
GFR SERPL CREATININE-BSD FRML MDRD: >90 ML/MIN/{1.73_M2}
GLUCOSE SERPL-MCNC: 107 MG/DL (ref 70–99)
HDLC SERPL-MCNC: 58 MG/DL
LDLC SERPL CALC-MCNC: 120 MG/DL
NONHDLC SERPL-MCNC: 158 MG/DL
POTASSIUM SERPL-SCNC: 4.5 MMOL/L (ref 3.4–5.3)
SODIUM SERPL-SCNC: 140 MMOL/L (ref 133–144)
TRIGL SERPL-MCNC: 191 MG/DL

## 2019-09-19 PROCEDURE — 80048 BASIC METABOLIC PNL TOTAL CA: CPT | Performed by: PHYSICIAN ASSISTANT

## 2019-09-19 PROCEDURE — 80061 LIPID PANEL: CPT | Performed by: PHYSICIAN ASSISTANT

## 2019-09-19 PROCEDURE — 36415 COLL VENOUS BLD VENIPUNCTURE: CPT | Performed by: PHYSICIAN ASSISTANT

## 2019-09-25 ENCOUNTER — TELEPHONE (OUTPATIENT)
Dept: FAMILY MEDICINE | Facility: CLINIC | Age: 49
End: 2019-09-25

## 2019-09-25 NOTE — TELEPHONE ENCOUNTER
Patient states she dropped off her physical attestation about a month ago at her appointment with Jt and had the blood work done last week now and wonders when the form will be done.  Please call.    Thank you.

## 2019-09-30 ENCOUNTER — HEALTH MAINTENANCE LETTER (OUTPATIENT)
Age: 49
End: 2019-09-30

## 2019-10-01 NOTE — TELEPHONE ENCOUNTER
Received form via fax. Form was due yesterday, patient is asking that it be completed ASAP.     Placed in provider's basket to complete. Once done please place in TC basket for faxing.     Patient would like it to be faxed to [facility] at [phone] and also to herself at [phone]    MARY Rawls

## 2019-10-01 NOTE — TELEPHONE ENCOUNTER
I faxed the completed form to [facility] @[phone] and to Omaira @[phone].  I spoke to Omaira and let her know that I faxed it to both fax numbers.  Elina Riley,

## 2019-10-22 DIAGNOSIS — F41.1 GAD (GENERALIZED ANXIETY DISORDER): ICD-10-CM

## 2019-10-23 RX ORDER — PAROXETINE 10 MG/1
TABLET, FILM COATED ORAL
Qty: 180 TABLET | Refills: 0 | Status: SHIPPED | OUTPATIENT
Start: 2019-10-23 | End: 2020-01-24

## 2019-10-23 NOTE — TELEPHONE ENCOUNTER
Prescription approved per [facility] Refill Protocol.  Lindsey Chu RN

## 2019-12-12 DIAGNOSIS — Z30.011 ENCOUNTER FOR INITIAL PRESCRIPTION OF CONTRACEPTIVE PILLS: ICD-10-CM

## 2019-12-12 RX ORDER — LEVONORGESTREL/ETHIN.ESTRADIOL 0.1-0.02MG
1 TABLET ORAL DAILY
Qty: 84 TABLET | Refills: 0 | Status: SHIPPED | OUTPATIENT
Start: 2019-12-12 | End: 2020-03-02

## 2020-01-24 DIAGNOSIS — F41.1 GAD (GENERALIZED ANXIETY DISORDER): ICD-10-CM

## 2020-01-24 RX ORDER — PAROXETINE 10 MG/1
TABLET, FILM COATED ORAL
Qty: 180 TABLET | Refills: 0 | Status: SHIPPED | OUTPATIENT
Start: 2020-01-24 | End: 2020-04-27

## 2020-01-24 NOTE — TELEPHONE ENCOUNTER
"Requested Prescriptions   Signed Prescriptions Disp Refills    PARoxetine (PAXIL) 10 MG tablet 180 tablet 0     Sig: TAKE 2 TABLETS(20 MG) BY MOUTH EVERY MORNING       SSRIs Protocol Passed - 1/24/2020  8:48 AM        Passed - Recent (12 mo) or future (30 days) visit within the authorizing provider's specialty     Patient has had an office visit with the authorizing provider or a provider within the authorizing providers department within the previous 12 mos or has a future within next 30 days. See \"Patient Info\" tab in inbasket, or \"Choose Columns\" in Meds & Orders section of the refill encounter.              Passed - Medication is active on med list        Passed - Patient is age 18 or older        Passed - No active pregnancy on record        Passed - No positive pregnancy test in last 12 months          "

## 2020-02-28 DIAGNOSIS — Z30.011 ENCOUNTER FOR INITIAL PRESCRIPTION OF CONTRACEPTIVE PILLS: ICD-10-CM

## 2020-02-28 NOTE — LETTER
March 2, 2020    Omaira Smith  [address] [address] [place] [address]    Dear Omaira,       We recently received a refill request for LESSINA-28 0.1-20 MG-MCG tablet.  We have refilled this for a one time supply only because you are due for a:    Physical office visit for further refills.      Please call at your earliest convenience so that there will not be a delay with your future refills.          Thank you,   Your [facility] Team/[facility]  [phone]

## 2020-03-02 RX ORDER — LEVONORGESTREL AND ETHINYL ESTRADIOL 0.1-0.02MG
KIT ORAL
Qty: 28 TABLET | Refills: 0 | Status: SHIPPED | OUTPATIENT
Start: 2020-03-02 | End: 2020-03-18

## 2020-03-02 NOTE — TELEPHONE ENCOUNTER
Medication is being filled for 1 time refill only due to:  pt due for AFE for more refills          - please send letter    Danay PRINGLEN, RN, CPN

## 2020-03-18 DIAGNOSIS — Z30.011 ENCOUNTER FOR INITIAL PRESCRIPTION OF CONTRACEPTIVE PILLS: ICD-10-CM

## 2020-03-20 RX ORDER — LEVONORGESTREL/ETHIN.ESTRADIOL 0.1-0.02MG
1 TABLET ORAL DAILY
Qty: 84 TABLET | Refills: 0 | Status: SHIPPED | OUTPATIENT
Start: 2020-03-20 | End: 2020-05-11

## 2020-07-25 DIAGNOSIS — F41.1 GAD (GENERALIZED ANXIETY DISORDER): ICD-10-CM

## 2020-07-27 RX ORDER — PAROXETINE 10 MG/1
TABLET, FILM COATED ORAL
Qty: 180 TABLET | Refills: 0 | Status: SHIPPED | OUTPATIENT
Start: 2020-07-27 | End: 2020-08-20

## 2020-08-13 ENCOUNTER — DOCUMENTATION ONLY (OUTPATIENT)
Dept: LAB | Facility: CLINIC | Age: 50
End: 2020-08-13

## 2020-08-13 DIAGNOSIS — Z13.1 SCREENING FOR DIABETES MELLITUS: Primary | ICD-10-CM

## 2020-08-13 DIAGNOSIS — Z13.220 SCREENING CHOLESTEROL LEVEL: ICD-10-CM

## 2020-08-13 NOTE — PROGRESS NOTES
.Please place or confirm pending lab orders for upcoming lab appointment on 8/16/20  Thank you,  Samara

## 2020-08-16 DIAGNOSIS — Z13.1 SCREENING FOR DIABETES MELLITUS: ICD-10-CM

## 2020-08-16 DIAGNOSIS — Z13.220 SCREENING CHOLESTEROL LEVEL: ICD-10-CM

## 2020-08-16 PROCEDURE — 80061 LIPID PANEL: CPT | Performed by: PHYSICIAN ASSISTANT

## 2020-08-16 PROCEDURE — 80048 BASIC METABOLIC PNL TOTAL CA: CPT | Performed by: PHYSICIAN ASSISTANT

## 2020-08-16 PROCEDURE — 36415 COLL VENOUS BLD VENIPUNCTURE: CPT | Performed by: PHYSICIAN ASSISTANT

## 2020-08-17 LAB
ANION GAP SERPL CALCULATED.3IONS-SCNC: 7 MMOL/L (ref 3–14)
BUN SERPL-MCNC: 18 MG/DL (ref 7–30)
CALCIUM SERPL-MCNC: 8.2 MG/DL (ref 8.5–10.1)
CHLORIDE SERPL-SCNC: 112 MMOL/L (ref 94–109)
CHOLEST SERPL-MCNC: 212 MG/DL
CO2 SERPL-SCNC: 23 MMOL/L (ref 20–32)
CREAT SERPL-MCNC: 0.82 MG/DL (ref 0.52–1.04)
GFR SERPL CREATININE-BSD FRML MDRD: 83 ML/MIN/{1.73_M2}
GLUCOSE SERPL-MCNC: 103 MG/DL (ref 70–99)
HDLC SERPL-MCNC: 45 MG/DL
LDLC SERPL CALC-MCNC: 146 MG/DL
NONHDLC SERPL-MCNC: 167 MG/DL
POTASSIUM SERPL-SCNC: 3.9 MMOL/L (ref 3.4–5.3)
SODIUM SERPL-SCNC: 142 MMOL/L (ref 133–144)
TRIGL SERPL-MCNC: 107 MG/DL

## 2020-08-19 NOTE — RESULT ENCOUNTER NOTE
Ms. Felizaraceli,    All of your labs were normal/near normal for you.    Please contact the clinic if you have additional questions.  Thank you.    Sincerely,    Jt Hollis PA-C

## 2020-08-20 ENCOUNTER — OFFICE VISIT (OUTPATIENT)
Dept: FAMILY MEDICINE | Facility: CLINIC | Age: 50
End: 2020-08-20
Payer: COMMERCIAL

## 2020-08-20 VITALS
TEMPERATURE: 98.8 F | DIASTOLIC BLOOD PRESSURE: 79 MMHG | OXYGEN SATURATION: 100 % | BODY MASS INDEX: 33.27 KG/M2 | SYSTOLIC BLOOD PRESSURE: 122 MMHG | HEIGHT: 67 IN | WEIGHT: 212 LBS | HEART RATE: 71 BPM

## 2020-08-20 DIAGNOSIS — Z30.011 ENCOUNTER FOR INITIAL PRESCRIPTION OF CONTRACEPTIVE PILLS: ICD-10-CM

## 2020-08-20 DIAGNOSIS — Z00.00 ROUTINE GENERAL MEDICAL EXAMINATION AT A HEALTH CARE FACILITY: Primary | ICD-10-CM

## 2020-08-20 DIAGNOSIS — F41.1 GAD (GENERALIZED ANXIETY DISORDER): ICD-10-CM

## 2020-08-20 DIAGNOSIS — Z12.9 CANCER SCREENING: ICD-10-CM

## 2020-08-20 PROCEDURE — 99396 PREV VISIT EST AGE 40-64: CPT | Performed by: PHYSICIAN ASSISTANT

## 2020-08-20 RX ORDER — LEVONORGESTREL/ETHIN.ESTRADIOL 0.1-0.02MG
1 TABLET ORAL DAILY
Qty: 84 TABLET | Refills: 1 | Status: SHIPPED | OUTPATIENT
Start: 2020-08-20 | End: 2021-09-01

## 2020-08-20 RX ORDER — PAROXETINE 10 MG/1
TABLET, FILM COATED ORAL
Qty: 180 TABLET | Refills: 1 | Status: SHIPPED | OUTPATIENT
Start: 2020-08-20 | End: 2021-05-19

## 2020-08-20 RX ORDER — TOPIRAMATE 25 MG/1
TABLET, FILM COATED ORAL
COMMUNITY
Start: 2020-07-20 | End: 2021-09-01

## 2020-08-20 RX ORDER — ZONISAMIDE 25 MG/1
CAPSULE ORAL
COMMUNITY
Start: 2020-07-20 | End: 2021-09-01

## 2020-08-20 ASSESSMENT — MIFFLIN-ST. JEOR: SCORE: 1614.26

## 2020-08-20 NOTE — PROGRESS NOTES
SUBJECTIVE:   CC: Omaira Smith is an 50 year old woman who presents for preventive health visit.     Healthy Habits:      Do you get at least three servings of calcium containing foods daily (dairy, green leafy vegetables, etc.)? yes    Amount of exercise or daily activities, outside of work: not with injuries that she has had    Problems taking medications regularly No    Medication side effects: Yes exhausted and abdominal discomfort- Topimax and Zonegran    Have you had an eye exam in the past two years? yes    Do you see a dentist twice per year? yes    Do you have sleep apnea, excessive snoring or daytime drowsiness?no    She is a history of anxiety and controlled by Paxil.  She has a history of RSD that is managed by pain clinic.  She states her pain comes and goes and she feels very frustrated by the management of it.  Today's PHQ-2 Score:   PHQ-2 ( 1999 Pf[employer]) 11/13/2017 1/16/2017   Q1: Little interest or pleasure in doing things 1 0   Q2: Feeling down, depressed or hopeless 1 0   PHQ-2 Score 2 0   Q1: Little interest or pleasure in doing things Several days -   Q2: Feeling down, depressed or hopeless Several days -   PHQ-2 Score 2 -       Abuse: Current or Past(Physical, Sexual or Emotional)- No  Do you feel safe in your environment? Yes        Social History     Tobacco Use     Smoking status: Former Smoker     Smokeless tobacco: Never Used   Substance Use Topics     Alcohol use: Yes     Comment: occ     If you drink alcohol do you typically have >3 drinks per day or >7 drinks per week? No                     Reviewed orders with patient.  Reviewed health maintenance and updated orders accordingly - Yes  Labs reviewed in EPIC  BP Readings from Last 3 Encounters:   08/20/20 122/79   09/18/19 134/74   07/23/19 132/82    Wt Readings from Last 3 Encounters:   08/20/20 96.2 kg (212 lb)   09/18/19 94.8 kg (209 lb)   07/23/19 94.8 kg (209 lb)                  Patient Active Problem List   Diagnosis      Migraines     RSD (reflex sympathetic dystrophy)     CARDIOVASCULAR SCREENING; LDL GOAL LESS THAN 160     Ankle pain     Cervical high risk HPV (human papillomavirus) test positive     JESUS (generalized anxiety disorder)     Panic attack     Plantar fasciitis     BMI 30.0-30.9,adult     Past Surgical History:   Procedure Laterality Date     APPENDECTOMY  2017     BACK SURGERY  2017     CHOLECYSTECTOMY       ENT SURGERY  age 15    tonsilectomy     EYE SURGERY  age 5    Rt lazy eye repair     EYE SURGERY  age 15    Rt eye     ORTHOPEDIC SURGERY      Lt leg 5X       Social History     Tobacco Use     Smoking status: Former Smoker     Smokeless tobacco: Never Used   Substance Use Topics     Alcohol use: Yes     Comment: occ     Family History   Family history unknown: Yes         Current Outpatient Medications   Medication Sig Dispense Refill     levonorgestrel-ethinyl estradiol (LESSINA-28) 0.1-20 MG-MCG tablet Take 1 tablet by mouth daily 84 tablet 1     PARoxetine (PAXIL) 10 MG tablet TAKE 2 TABLETS(20 MG) BY MOUTH EVERY MORNING 180 tablet 1     topiramate (TOPAMAX) 25 MG tablet        zonisamide (ZONEGRAN) 25 MG capsule TK 4 CS PO ONCE A DAY       Acetaminophen (TYLENOL PO) Take  by mouth 3 times daily.       Allergies   Allergen Reactions     Gabapentin Other (See Comments)     Irritability     Ibuprofen Other (See Comments)     LW Reaction: stomach pain       Mammo discussed, appropriate     Pertinent mammograms are reviewed under the imaging tab.  History of abnormal Pap smear: YES - updated in Problem List and Health Maintenance accordingly  PAP / HPV Latest Ref Rng & Units 11/16/2017 3/9/2016 3/6/2015   PAP - NIL NIL NIL   HPV 16 DNA NEG:Negative Negative Negative -   HPV 18 DNA NEG:Negative Negative Positive(A) -   OTHER HR HPV NEG:Negative Negative Negative -     Reviewed and updated as needed this visit by clinical staff  Tobacco  Allergies  Meds  Problems  Med Hx  Surg Hx  Fam Hx  Soc Hx       "    Reviewed and updated as needed this visit by Provider  Tobacco  Allergies  Meds  Problems  Med Hx  Surg Hx  Fam Hx          Past Medical History:   Diagnosis Date     Anxiety      Cervical high risk HPV (human papillomavirus) test positive 3/2015, 3/2016    type 18     JESUS (generalized anxiety disorder) 12/7/2016     Migraines      RSD (reflex sympathetic dystrophy)       Past Surgical History:   Procedure Laterality Date     APPENDECTOMY  2017     BACK SURGERY  2017     CHOLECYSTECTOMY       ENT SURGERY  age 15    tonsilectomy     EYE SURGERY  age 5    Rt lazy eye repair     EYE SURGERY  age 15    Rt eye     ORTHOPEDIC SURGERY      Lt leg 5X       ROS:  CONSTITUTIONAL: NEGATIVE for fever, chills, change in weight  INTEGUMENTARY/SKIN: NEGATIVE for worrisome rashes, moles or lesions  EYES: NEGATIVE for vision changes or irritation  ENT: NEGATIVE for ear, mouth and throat problems  RESP: NEGATIVE for significant cough or SOB  BREAST: NEGATIVE for masses, tenderness or discharge  CV: NEGATIVE for chest pain, palpitations or peripheral edema  GI: NEGATIVE for nausea, abdominal pain, heartburn, or change in bowel habits  : NEGATIVE for unusual urinary or vaginal symptoms. No vaginal bleeding.  MUSCULOSKELETAL: NEGATIVE for significant arthralgias or myalgia  NEURO: NEGATIVE for weakness, dizziness or paresthesias  PSYCHIATRIC: NEGATIVE for changes in mood or affect     OBJECTIVE:   /79   Pulse 71   Temp 98.8  F (37.1  C) (Tympanic)   Ht 1.702 m (5' 7\")   Wt 96.2 kg (212 lb)   SpO2 100%   BMI 33.20 kg/m    EXAM:  GENERAL: healthy, alert and no distress  EYES: Eyes grossly normal to inspection, PERRL and conjunctivae and sclerae normal  HENT: ear canals and TM's normal, nose and mouth without ulcers or lesions  NECK: no adenopathy, no asymmetry, masses, or scars and thyroid normal to palpation  RESP: lungs clear to auscultation - no rales, rhonchi or wheezes  BREAST:  defered  CV: regular rate and " "rhythm, normal S1 S2, no S3 or S4, no murmur, click or rub, no peripheral edema and peripheral pulses strong  ABDOMEN: soft, nontender, no hepatosplenomegaly, no masses and bowel sounds normal   (female): deferred  MS: no gross musculoskeletal defects noted, no edema  SKIN: no suspicious lesions or rashes  NEURO: Normal strength and tone, mentation intact and speech normal  PSYCH: mentation appears normal, affect normal/bright    Diagnostic Test Results:  Labs reviewed in Epic    ASSESSMENT/PLAN:       ICD-10-CM    1. Routine general medical examination at a health care facility  Z00.00 *MA Screening Digital Bilateral   2. Encounter for initial prescription of contraceptive pills  Z30.011 levonorgestrel-ethinyl estradiol (LESSINA-28) 0.1-20 MG-MCG tablet   3. JESUS (generalized anxiety disorder)  F41.1 PARoxetine (PAXIL) 10 MG tablet   4. Cancer screening  Z12.9 GASTROENTEROLOGY ADULT REF PROCEDURE ONLY   Talk to patient about her concerns.  Medications were refilled.  We talked about stopping her birth control due to her age and possible risk factors.  She states she wants to stay on it for couple more months until she gets her pain management control first and then she is going to stop it.  We talked about side effects and warning signs.  If she continues to have any problems or breakthrough bleeding she will follow-up with OB/GYN.  She is due for colonoscopy and mammogram.  She deferred all female exams today.  Recheck yearly.  COUNSELING:   Reviewed preventive health counseling, as reflected in patient instructions       Regular exercise       Healthy diet/nutrition       Vision screening    Estimated body mass index is 33.2 kg/m  as calculated from the following:    Height as of this encounter: 1.702 m (5' 7\").    Weight as of this encounter: 96.2 kg (212 lb).    Weight management plan: Discussed healthy diet and exercise guidelines     reports that she has quit smoking. She has never used smokeless " tobacco.      Counseling Resources:  ATP IV Guidelines  Pooled Cohorts Equation Calculator  Breast Cancer Risk Calculator  FRAX Risk Assessment  ICSI Preventive Guidelines  Dietary Guidelines for Americans, 2010  USDA's MyPlate  ASA Prophylaxis  Lung CA Screening    The 10-year ASCVD risk score (Manju VALDEZ Jr., et al., 2013) is: 1.6%    Values used to calculate the score:      Age: 50 years      Sex: Female      Is Non- : No      Diabetic: No      Tobacco smoker: No      Systolic Blood Pressure: 122 mmHg      Is BP treated: No      HDL Cholesterol: 45 mg/dL      Total Cholesterol: 212 mg/dL   Jt Hollis PA-C  [facility]

## 2020-09-02 ENCOUNTER — TELEPHONE (OUTPATIENT)
Dept: FAMILY MEDICINE | Facility: CLINIC | Age: 50
End: 2020-09-02

## 2020-09-02 DIAGNOSIS — R53.83 FATIGUE, UNSPECIFIED TYPE: Primary | ICD-10-CM

## 2020-09-02 NOTE — TELEPHONE ENCOUNTER
"I spoke with patient.  I made her aware Jt Hollis PA-C has placed lab orders for CBC, TSH, Vit D to further assess her fatigue.  I offered to make her a nonfasting lab appointment.  She declined.  States \"I just wondered why it wasn't included with my initial labs\"  Her initial labwork was done \"previsit\" for her physical.  This labwork is for her ongoing concerns with fatigue.  Amaya Rosa RN    "

## 2020-09-02 NOTE — TELEPHONE ENCOUNTER
I can add CBC and TSH and Vit D to look more into her fatigue.   Please help her make non-fasting apt.     Thanks  Jt Hollis PA-C

## 2020-09-02 NOTE — TELEPHONE ENCOUNTER
Patient is wanting to know why her CBC wasn't checked at her well check on 8/20.  States she was complaining of feeling weak and exhausted.  Had thought her CBC would have been checked.  Please advise.  Amaya Rosa RN

## 2020-09-02 NOTE — TELEPHONE ENCOUNTER
Reason for Call:  Other appointment    Detailed comments: wondering why her and her  had different blood work done at their physicals     Phone Number Patient can be reached at: Home number on file [phone] (home)    Best Time: any        Can we leave a detailed message on this number? YES    Call taken on 9/2/2020 at 10:59 AM by Sylvia Franco

## 2021-01-15 ENCOUNTER — HEALTH MAINTENANCE LETTER (OUTPATIENT)
Age: 51
End: 2021-01-15

## 2021-01-17 ENCOUNTER — HEALTH MAINTENANCE LETTER (OUTPATIENT)
Age: 51
End: 2021-01-17

## 2021-05-19 DIAGNOSIS — F41.1 GAD (GENERALIZED ANXIETY DISORDER): ICD-10-CM

## 2021-05-19 RX ORDER — PAROXETINE 10 MG/1
TABLET, FILM COATED ORAL
Qty: 180 TABLET | Refills: 0 | Status: SHIPPED | OUTPATIENT
Start: 2021-05-19 | End: 2021-08-25

## 2021-05-19 NOTE — TELEPHONE ENCOUNTER
Prescription approved per [facility] Refill Protocol.  Due for office visit August.   Lindsey Chu RN

## 2021-08-16 ENCOUNTER — MYC MEDICAL ADVICE (OUTPATIENT)
Dept: FAMILY MEDICINE | Facility: CLINIC | Age: 51
End: 2021-08-16

## 2021-08-17 NOTE — TELEPHONE ENCOUNTER
Please advise, would you be willing to sign a Waiver for patient to not receive the Covid 19 vaccine?   Lindsey Chu RN

## 2021-08-25 DIAGNOSIS — F41.1 GAD (GENERALIZED ANXIETY DISORDER): ICD-10-CM

## 2021-08-25 RX ORDER — PAROXETINE 10 MG/1
TABLET, FILM COATED ORAL
Qty: 180 TABLET | Refills: 0 | Status: SHIPPED | OUTPATIENT
Start: 2021-08-25 | End: 2021-11-19

## 2021-08-27 ASSESSMENT — ENCOUNTER SYMPTOMS
ABDOMINAL PAIN: 0
WEAKNESS: 1
NAUSEA: 0
CONSTIPATION: 0
DYSURIA: 0
HEMATOCHEZIA: 0
HEADACHES: 0
CHILLS: 0
EYE PAIN: 0
COUGH: 0
BREAST MASS: 0
SHORTNESS OF BREATH: 0
JOINT SWELLING: 1
PARESTHESIAS: 1
DIZZINESS: 0
MYALGIAS: 1
FREQUENCY: 0
PALPITATIONS: 0
SORE THROAT: 0
NERVOUS/ANXIOUS: 0
DIARRHEA: 0
HEMATURIA: 0
FEVER: 0
HEARTBURN: 0
ARTHRALGIAS: 1

## 2021-08-30 ENCOUNTER — LAB (OUTPATIENT)
Dept: LAB | Facility: CLINIC | Age: 51
End: 2021-08-30
Payer: COMMERCIAL

## 2021-08-30 DIAGNOSIS — Z13.1 SCREENING FOR DIABETES MELLITUS: Primary | ICD-10-CM

## 2021-08-30 DIAGNOSIS — Z00.00 ROUTINE GENERAL MEDICAL EXAMINATION AT A HEALTH CARE FACILITY: ICD-10-CM

## 2021-08-30 DIAGNOSIS — R53.83 FATIGUE, UNSPECIFIED TYPE: ICD-10-CM

## 2021-08-30 LAB
DEPRECATED CALCIDIOL+CALCIFEROL SERPL-MC: 31 UG/L (ref 20–75)
ERYTHROCYTE [DISTWIDTH] IN BLOOD BY AUTOMATED COUNT: 12.8 % (ref 10–15)
HBA1C MFR BLD: 5.2 % (ref 0–5.6)
HCT VFR BLD AUTO: 40.4 % (ref 35–47)
HGB BLD-MCNC: 13.7 G/DL (ref 11.7–15.7)
MCH RBC QN AUTO: 31.9 PG (ref 26.5–33)
MCHC RBC AUTO-ENTMCNC: 33.9 G/DL (ref 31.5–36.5)
MCV RBC AUTO: 94 FL (ref 78–100)
PLATELET # BLD AUTO: 248 10E3/UL (ref 150–450)
RBC # BLD AUTO: 4.29 10E6/UL (ref 3.8–5.2)
TSH SERPL DL<=0.005 MIU/L-ACNC: 2.53 MU/L (ref 0.4–4)
WBC # BLD AUTO: 10 10E3/UL (ref 4–11)

## 2021-08-30 PROCEDURE — 83036 HEMOGLOBIN GLYCOSYLATED A1C: CPT

## 2021-08-30 PROCEDURE — 84443 ASSAY THYROID STIM HORMONE: CPT

## 2021-08-30 PROCEDURE — 80061 LIPID PANEL: CPT

## 2021-08-30 PROCEDURE — 36415 COLL VENOUS BLD VENIPUNCTURE: CPT

## 2021-08-30 PROCEDURE — 85027 COMPLETE CBC AUTOMATED: CPT

## 2021-08-30 PROCEDURE — 82306 VITAMIN D 25 HYDROXY: CPT

## 2021-08-31 ENCOUNTER — MYC MEDICAL ADVICE (OUTPATIENT)
Dept: FAMILY MEDICINE | Facility: CLINIC | Age: 51
End: 2021-08-31

## 2021-08-31 NOTE — PROGRESS NOTES
SUBJECTIVE:   CC: Omaria Smith is an 51 year old woman who presents for preventive health visit.       Patient has been advised of split billing requirements and indicates understanding: Yes  Healthy Habits:     Getting at least 3 servings of Calcium per day:  Yes    Bi-annual eye exam:  Yes    Dental care twice a year:  Yes    Sleep apnea or symptoms of sleep apnea:  Daytime drowsiness    Diet:  Regular (no restrictions)    Frequency of exercise:  None    Taking medications regularly:  Yes    Medication side effects:  Not applicable    PHQ-2 Total Score: 2    Additional concerns today:  No      Today's PHQ-2 Score:   PHQ-2 (  [employer]) 2021   Q1: Little interest or pleasure in doing things 2   Q2: Feeling down, depressed or hopeless 0   PHQ-2 Score 2   Q1: Little interest or pleasure in doing things More than half the days   Q2: Feeling down, depressed or hopeless Not at all   PHQ-2 Score 2       Abuse: Current or Past (Physical, Sexual or Emotional) - No  Do you feel safe in your environment? Yes    Have you ever done Advance Care Planning? (For example, a Health Directive, POLST, or a discussion with a medical provider or your loved ones about your wishes): No, advance care planning information given to patient to review.  Patient declined advance care planning discussion at this time.    Social History     Tobacco Use     Smoking status: Former Smoker     Packs/day: 0.50     Years: 10.00     Pack years: 5.00     Types: Cigarettes     Start date: 1988     Quit date: 2/3/2013     Years since quittin.5     Smokeless tobacco: Never Used   Substance Use Topics     Alcohol use: Yes     Comment: occ         Alcohol Use 2021   Prescreen: >3 drinks/day or >7 drinks/week? No   Prescreen: >3 drinks/day or >7 drinks/week? -     Reviewed orders with patient.  Reviewed health maintenance and updated orders accordingly - Yes  Lab work is in process  Labs reviewed in EPIC  BP Readings from Last 3  Encounters:   21 138/85   20 122/79   19 134/74    Wt Readings from Last 3 Encounters:   21 90.7 kg (200 lb)   20 96.2 kg (212 lb)   19 94.8 kg (209 lb)                  Patient Active Problem List   Diagnosis     Migraines     RSD (reflex sympathetic dystrophy)     CARDIOVASCULAR SCREENING; LDL GOAL LESS THAN 160     Ankle pain     Cervical high risk HPV (human papillomavirus) test positive     JESUS (generalized anxiety disorder)     Panic attack     Plantar fasciitis     BMI 30.0-30.9,adult     Past Surgical History:   Procedure Laterality Date     APPENDECTOMY  2017     BACK SURGERY  2017     CHOLECYSTECTOMY       ENT SURGERY  age 15    tonsilectomy     EYE SURGERY  age 5    Rt lazy eye repair     EYE SURGERY  age 15    Rt eye     ORTHOPEDIC SURGERY      Lt leg 5X       Social History     Tobacco Use     Smoking status: Former Smoker     Packs/day: 0.50     Years: 10.00     Pack years: 5.00     Types: Cigarettes     Start date: 1988     Quit date: 2/3/2013     Years since quittin.5     Smokeless tobacco: Never Used   Substance Use Topics     Alcohol use: Yes     Comment: occ     Family History   Family history unknown: Yes         Current Outpatient Medications   Medication Sig Dispense Refill     PARoxetine (PAXIL) 10 MG tablet TAKE 2 TABLETS(20 MG) BY MOUTH EVERY MORNING 180 tablet 0     Allergies   Allergen Reactions     Gabapentin Other (See Comments)     Irritability     Ibuprofen Other (See Comments)     LW Reaction: stomach pain       Breast Cancer Screening:    Breast CA Risk Assessment (FHS-7) 2021   Do you have a family history of breast, colon, or ovarian cancer? No / Unknown         Mammogram Screening: Recommended annual mammography  Pertinent mammograms are reviewed under the imaging tab.    History of abnormal Pap smear: NO - age 30- 65 PAP every 3 years recommended  PAP / HPV Latest Ref Rng & Units 2017 3/9/2016 3/6/2015   PAP (Historical) -  "NIL NIL NIL   HPV16 NEG:Negative Negative Negative -   HPV18 NEG:Negative Negative Positive(A) -   HRHPV NEG:Negative Negative Negative -     Reviewed and updated as needed this visit by clinical staff   Allergies  Meds  Problems  Med Hx  Surg Hx           Reviewed and updated as needed this visit by Provider     Problems              Past Medical History:   Diagnosis Date     Anxiety      Cervical high risk HPV (human papillomavirus) test positive 3/2015, 3/2016    type 18     JESUS (generalized anxiety disorder) 12/7/2016     Migraines      RSD (reflex sympathetic dystrophy)       Past Surgical History:   Procedure Laterality Date     APPENDECTOMY  2017     BACK SURGERY  2017     CHOLECYSTECTOMY       ENT SURGERY  age 15    tonsilectomy     EYE SURGERY  age 5    Rt lazy eye repair     EYE SURGERY  age 15    Rt eye     ORTHOPEDIC SURGERY      Lt leg 5X     Review of Systems   Constitutional: Negative for chills and fever.   HENT: Negative for congestion, ear pain, hearing loss and sore throat.    Eyes: Negative for pain and visual disturbance.   Respiratory: Negative for cough and shortness of breath.    Cardiovascular: Positive for peripheral edema. Negative for chest pain and palpitations.   Gastrointestinal: Negative for abdominal pain, constipation, diarrhea, heartburn, hematochezia and nausea.   Breasts:  Negative for tenderness, breast mass and discharge.   Genitourinary: Negative for dysuria, frequency, genital sores, hematuria, pelvic pain, urgency, vaginal bleeding and vaginal discharge.   Musculoskeletal: Positive for arthralgias, joint swelling and myalgias.   Skin: Negative for rash.   Neurological: Positive for weakness and paresthesias. Negative for dizziness and headaches.   Psychiatric/Behavioral: Negative for mood changes. The patient is not nervous/anxious.       OBJECTIVE:   /85   Pulse 74   Temp 97.2  F (36.2  C) (Tympanic)   Ht 1.765 m (5' 9.5\")   Wt 90.7 kg (200 lb)   SpO2 100%  " " BMI 29.11 kg/m    Physical Exam  GENERAL: healthy, alert and no distress  EYES: Eyes grossly normal to inspection, PERRL and conjunctivae and sclerae normal  HENT: ear canals and TM's normal, nose and mouth without ulcers or lesions  NECK: no adenopathy, no asymmetry, masses, or scars and thyroid normal to palpation  RESP: lungs clear to auscultation - no rales, rhonchi or wheezes  BREAST: deferred  CV: regular rate and rhythm, normal S1 S2, no S3 or S4, no murmur, click or rub, no peripheral edema and peripheral pulses strong  ABDOMEN: soft, nontender, no hepatosplenomegaly, no masses and bowel sounds normal   (female): deferred  MS: no gross musculoskeletal defects noted, no edema  SKIN: no suspicious lesions or rashes  NEURO: Normal strength and tone, mentation intact and speech normal  PSYCH: mentation appears normal, affect normal/bright    Diagnostic Test Results:  Labs reviewed in Epic    ASSESSMENT/PLAN:       ICD-10-CM    1. Routine general medical examination at a health care facility  Z00.00 Lipid panel reflex to direct LDL Fasting     Glucose, whole blood   2. Encounter for screening mammogram for breast cancer  Z12.31 *MA Screening Digital Bilateral   3. Screening for malignant neoplasm of the rectum  Z12.12 Adult Gastro Ref - Procedure Only       Patient has been advised of split billing requirements and indicates understanding: Yes  COUNSELING:  Reviewed preventive health counseling, as reflected in patient instructions       Regular exercise       Healthy diet/nutrition    Estimated body mass index is 29.11 kg/m  as calculated from the following:    Height as of this encounter: 1.765 m (5' 9.5\").    Weight as of this encounter: 90.7 kg (200 lb).    Weight management plan: Discussed healthy diet and exercise guidelines    She reports that she quit smoking about 8 years ago. Her smoking use included cigarettes. She started smoking about 33 years ago. She has a 5.00 pack-year smoking history. She " has never used smokeless tobacco.      Counseling Resources:  ATP IV Guidelines  Pooled Cohorts Equation Calculator  Breast Cancer Risk Calculator  BRCA-Related Cancer Risk Assessment: FHS-7 Tool  FRAX Risk Assessment  ICSI Preventive Guidelines  Dietary Guidelines for Americans, 2010  USDA's MyPlate  ASA Prophylaxis  Lung CA Screening    KIRAN Damon [facility]

## 2021-09-01 ENCOUNTER — OFFICE VISIT (OUTPATIENT)
Dept: FAMILY MEDICINE | Facility: CLINIC | Age: 51
End: 2021-09-01
Payer: COMMERCIAL

## 2021-09-01 VITALS
WEIGHT: 200 LBS | HEIGHT: 70 IN | DIASTOLIC BLOOD PRESSURE: 85 MMHG | HEART RATE: 74 BPM | SYSTOLIC BLOOD PRESSURE: 138 MMHG | BODY MASS INDEX: 28.63 KG/M2 | TEMPERATURE: 97.2 F | OXYGEN SATURATION: 100 %

## 2021-09-01 DIAGNOSIS — Z12.12 SCREENING FOR MALIGNANT NEOPLASM OF THE RECTUM: ICD-10-CM

## 2021-09-01 DIAGNOSIS — Z12.31 ENCOUNTER FOR SCREENING MAMMOGRAM FOR BREAST CANCER: ICD-10-CM

## 2021-09-01 DIAGNOSIS — Z00.00 ROUTINE GENERAL MEDICAL EXAMINATION AT A HEALTH CARE FACILITY: Primary | ICD-10-CM

## 2021-09-01 LAB
CHOLEST SERPL-MCNC: 273 MG/DL
HDLC SERPL-MCNC: 73 MG/DL
LDLC SERPL CALC-MCNC: 173 MG/DL
NONHDLC SERPL-MCNC: 200 MG/DL
TRIGL SERPL-MCNC: 133 MG/DL

## 2021-09-01 PROCEDURE — 99396 PREV VISIT EST AGE 40-64: CPT | Performed by: PHYSICIAN ASSISTANT

## 2021-09-01 ASSESSMENT — ENCOUNTER SYMPTOMS
DIARRHEA: 0
PALPITATIONS: 0
SORE THROAT: 0
WEAKNESS: 1
FEVER: 0
JOINT SWELLING: 1
HEADACHES: 0
MYALGIAS: 1
HEMATURIA: 0
SHORTNESS OF BREATH: 0
DYSURIA: 0
NAUSEA: 0
HEMATOCHEZIA: 0
CHILLS: 0
CONSTIPATION: 0
ABDOMINAL PAIN: 0
ARTHRALGIAS: 1
DIZZINESS: 0
EYE PAIN: 0
COUGH: 0
BREAST MASS: 0
NERVOUS/ANXIOUS: 0
HEARTBURN: 0
FREQUENCY: 0
PARESTHESIAS: 1

## 2021-09-01 ASSESSMENT — MIFFLIN-ST. JEOR: SCORE: 1594.5

## 2021-09-21 ENCOUNTER — TELEPHONE (OUTPATIENT)
Dept: FAMILY MEDICINE | Facility: CLINIC | Age: 51
End: 2021-09-21

## 2021-09-21 NOTE — TELEPHONE ENCOUNTER
Patient Quality Outreach      Summary:    Patient has the following on her problem list/HM:     Patient is due/failing the following:   Breast Cancer Screening - Mammogram    Type of outreach:    Sent [employer] message.    Questions for provider review:    None                                                                                                                                     Rochelle Acevedo CMA       Chart routed to closed.

## 2021-10-24 ENCOUNTER — HEALTH MAINTENANCE LETTER (OUTPATIENT)
Age: 51
End: 2021-10-24

## 2022-02-13 ENCOUNTER — HEALTH MAINTENANCE LETTER (OUTPATIENT)
Age: 52
End: 2022-02-13

## 2022-05-17 ENCOUNTER — TELEPHONE (OUTPATIENT)
Dept: FAMILY MEDICINE | Facility: CLINIC | Age: 52
End: 2022-05-17
Payer: COMMERCIAL

## 2022-05-17 NOTE — TELEPHONE ENCOUNTER
Patient Quality Outreach    Patient is due for the following:   Colon Cancer Screening -  Colonoscopy  Breast Cancer Screening - Mammogram  Cervical Cancer Screening - PAP Needed    NEXT STEPS:   Schedule a office visit for Colonoscopy, Mammogram, Pap    Type of outreach:    Sent [email or web address] message.    Next Steps:  Reach out within 90 days via [email or web address].    Max number of attempts reached: No. Will try again in 90 days if patient still on fail list.    Questions for provider review:    None     Ashleigh Parson MA

## 2022-09-14 ENCOUNTER — LAB (OUTPATIENT)
Dept: LAB | Facility: CLINIC | Age: 52
End: 2022-09-14
Payer: COMMERCIAL

## 2022-09-14 ENCOUNTER — TRANSFERRED RECORDS (OUTPATIENT)
Dept: HEALTH INFORMATION MANAGEMENT | Facility: CLINIC | Age: 52
End: 2022-09-14

## 2022-09-14 ENCOUNTER — MYC MEDICAL ADVICE (OUTPATIENT)
Dept: FAMILY MEDICINE | Facility: CLINIC | Age: 52
End: 2022-09-14

## 2022-09-14 ENCOUNTER — DOCUMENTATION ONLY (OUTPATIENT)
Dept: LAB | Facility: CLINIC | Age: 52
End: 2022-09-14

## 2022-09-14 DIAGNOSIS — Z13.1 SCREENING FOR DIABETES MELLITUS: ICD-10-CM

## 2022-09-14 DIAGNOSIS — Z13.220 LIPID SCREENING: ICD-10-CM

## 2022-09-14 DIAGNOSIS — Z13.220 LIPID SCREENING: Primary | ICD-10-CM

## 2022-09-14 DIAGNOSIS — Z13.1 SCREENING FOR DIABETES MELLITUS: Primary | ICD-10-CM

## 2022-09-14 LAB
HBA1C MFR BLD: 5.3 % (ref 0–5.6)
HOLD SPECIMEN: NORMAL

## 2022-09-14 PROCEDURE — 83036 HEMOGLOBIN GLYCOSYLATED A1C: CPT

## 2022-09-14 PROCEDURE — 80061 LIPID PANEL: CPT

## 2022-09-14 PROCEDURE — 82947 ASSAY GLUCOSE BLOOD QUANT: CPT

## 2022-09-14 PROCEDURE — 36415 COLL VENOUS BLD VENIPUNCTURE: CPT

## 2022-09-14 NOTE — PROGRESS NOTES
I sent a [employer] message but not sure if patient check it.     Please clarify with patient what specific labs she needs done.  I believe she gets this done every year for her work forms.  There has been issues in the past that we did not get the right lab test done so please clarify.    Jt Hollis PA-C

## 2022-09-14 NOTE — PROGRESS NOTES
See [employer] message.   Patient responded and it was routed to Jt Hollis PA-C.    Yasmeen Gale BSN, RN

## 2022-09-14 NOTE — PROGRESS NOTES
Hi,   Pt was in today wanting her PVL. No orders were in. I drew a rainbow, so can you please add orders for what you are wanting done?     Thank you,   Silvia HERNANDEZT

## 2022-09-15 LAB
CHOLEST SERPL-MCNC: 258 MG/DL
FASTING STATUS PATIENT QL REPORTED: YES
FASTING STATUS PATIENT QL REPORTED: YES
GLUCOSE BLD-MCNC: 116 MG/DL (ref 70–99)
HDLC SERPL-MCNC: 65 MG/DL
LDLC SERPL CALC-MCNC: 167 MG/DL
NONHDLC SERPL-MCNC: 193 MG/DL
TRIGL SERPL-MCNC: 132 MG/DL

## 2022-09-15 ASSESSMENT — ENCOUNTER SYMPTOMS
CHILLS: 0
DIZZINESS: 0
NERVOUS/ANXIOUS: 0
EYE PAIN: 0
WEAKNESS: 0
SORE THROAT: 0
PARESTHESIAS: 0
HEMATOCHEZIA: 0
MYALGIAS: 0
SHORTNESS OF BREATH: 0
BREAST MASS: 0
HEMATURIA: 0
COUGH: 0
NAUSEA: 0
FEVER: 0
JOINT SWELLING: 0
HEADACHES: 0
ABDOMINAL PAIN: 0
CONSTIPATION: 0
DYSURIA: 0
FREQUENCY: 0
PALPITATIONS: 0
DIARRHEA: 1
HEARTBURN: 0
ARTHRALGIAS: 0

## 2022-09-22 ENCOUNTER — OFFICE VISIT (OUTPATIENT)
Dept: FAMILY MEDICINE | Facility: CLINIC | Age: 52
End: 2022-09-22
Payer: COMMERCIAL

## 2022-09-22 VITALS
HEART RATE: 76 BPM | DIASTOLIC BLOOD PRESSURE: 84 MMHG | HEIGHT: 69 IN | TEMPERATURE: 98.2 F | BODY MASS INDEX: 30.66 KG/M2 | RESPIRATION RATE: 14 BRPM | WEIGHT: 207 LBS | SYSTOLIC BLOOD PRESSURE: 124 MMHG | OXYGEN SATURATION: 98 %

## 2022-09-22 DIAGNOSIS — Z12.12 SCREENING FOR MALIGNANT NEOPLASM OF THE RECTUM: ICD-10-CM

## 2022-09-22 DIAGNOSIS — F41.1 GAD (GENERALIZED ANXIETY DISORDER): ICD-10-CM

## 2022-09-22 DIAGNOSIS — Z12.31 ENCOUNTER FOR SCREENING MAMMOGRAM FOR BREAST CANCER: ICD-10-CM

## 2022-09-22 DIAGNOSIS — Z00.00 ROUTINE GENERAL MEDICAL EXAMINATION AT A HEALTH CARE FACILITY: Primary | ICD-10-CM

## 2022-09-22 PROCEDURE — 2894A PR OFFICE/OUTPATIENT ESTABLISHED LOW MDM 20-29 MIN: CPT | Mod: 25 | Performed by: PHYSICIAN ASSISTANT

## 2022-09-22 PROCEDURE — 99396 PREV VISIT EST AGE 40-64: CPT | Performed by: PHYSICIAN ASSISTANT

## 2022-09-22 RX ORDER — TOPIRAMATE 100 MG/1
100 CAPSULE, EXTENDED RELEASE ORAL DAILY
COMMUNITY
Start: 2022-09-13

## 2022-09-22 RX ORDER — PAROXETINE 10 MG/1
TABLET, FILM COATED ORAL
Qty: 180 TABLET | Refills: 1 | Status: SHIPPED | OUTPATIENT
Start: 2022-09-22 | End: 2023-06-05

## 2022-09-22 ASSESSMENT — PAIN SCALES - GENERAL: PAINLEVEL: NO PAIN (0)

## 2022-09-22 ASSESSMENT — ENCOUNTER SYMPTOMS
SHORTNESS OF BREATH: 0
ARTHRALGIAS: 0
CHILLS: 0
ABDOMINAL PAIN: 0
CONSTIPATION: 0
DYSURIA: 0
HEADACHES: 0
DIARRHEA: 1
HEMATURIA: 0
DIZZINESS: 0
SORE THROAT: 0
PALPITATIONS: 0
HEARTBURN: 0
HEMATOCHEZIA: 0
NAUSEA: 0
MYALGIAS: 0
WEAKNESS: 0
FREQUENCY: 0
PARESTHESIAS: 0
BREAST MASS: 0
FEVER: 0
NERVOUS/ANXIOUS: 0
EYE PAIN: 0
COUGH: 0
JOINT SWELLING: 0

## 2022-09-22 NOTE — PROGRESS NOTES
SUBJECTIVE:   CC: Omaira is an 52 year old who presents for preventive health visit.     Patient has been advised of split billing requirements and indicates understanding: Yes  Healthy Habits:     Getting at least 3 servings of Calcium per day:  Yes    Bi-annual eye exam:  Yes    Dental care twice a year:  Yes    Sleep apnea or symptoms of sleep apnea:  None    Diet:  Regular (no restrictions)    Frequency of exercise:  None    Taking medications regularly:  Yes    PHQ-2 Total Score: 2    Additional concerns today:  Yes    Anxiety Follow-Up    How are you doing with your anxiety since your last visit? Stable, going well.    Are you having other symptoms that might be associated with anxiety? No    Have you had a significant life event? OTHER: continue struggles with pain and RSD. see's specialist.      Are you feeling depressed? No    Do you have any concerns with your use of alcohol or other drugs? No    Social History     Tobacco Use     Smoking status: Former Smoker     Packs/day: 0.50     Years: 10.00     Pack years: 5.00     Types: Cigarettes     Start date: 1988     Quit date: 2/3/2013     Years since quittin.6     Smokeless tobacco: Never Used   Vaping Use     Vaping Use: Never used   Substance Use Topics     Alcohol use: Yes     Comment: occ     Drug use: No     JESUS-7 SCORE 1/10/2019   Total Score 7     PHQ 1/10/2019 2019   PHQ-9 Total Score 13 2   Q9: Thoughts of better off dead/self-harm past 2 weeks Not at all Not at all       Today's PHQ-2 Score:   PHQ-2 (  [employer]) 9/15/2022   Q1: Little interest or pleasure in doing things 1   Q2: Feeling down, depressed or hopeless 1   PHQ-2 Score 2   PHQ-2 Total Score (12-17 Years)- Positive if 3 or more points; Administer PHQ-A if positive -   Q1: Little interest or pleasure in doing things Several days   Q2: Feeling down, depressed or hopeless Several days   PHQ-2 Score 2       Abuse: Current or Past (Physical, Sexual or Emotional) - No  Do you  feel safe in your environment? Yes        Social History     Tobacco Use     Smoking status: Former Smoker     Packs/day: 0.50     Years: 10.00     Pack years: 5.00     Types: Cigarettes     Start date: 1988     Quit date: 2/3/2013     Years since quittin.6     Smokeless tobacco: Never Used   Substance Use Topics     Alcohol use: Yes     Comment: occ         Alcohol Use 9/15/2022   Prescreen: >3 drinks/day or >7 drinks/week? No   Prescreen: >3 drinks/day or >7 drinks/week? -       Reviewed orders with patient.  Reviewed health maintenance and updated orders accordingly - Yes  Labs reviewed in EPIC  BP Readings from Last 3 Encounters:   22 124/84   21 138/85   20 122/79    Wt Readings from Last 3 Encounters:   22 93.9 kg (207 lb)   21 90.7 kg (200 lb)   20 96.2 kg (212 lb)                  Patient Active Problem List   Diagnosis     Migraines     RSD (reflex sympathetic dystrophy)     CARDIOVASCULAR SCREENING; LDL GOAL LESS THAN 160     Ankle pain     Cervical high risk HPV (human papillomavirus) test positive     JESUS (generalized anxiety disorder)     Panic attack     Plantar fasciitis     BMI 30.0-30.9,adult     Past Surgical History:   Procedure Laterality Date     APPENDECTOMY  2017     BACK SURGERY  2017     CHOLECYSTECTOMY       ENT SURGERY  age 15    tonsilectomy     EYE SURGERY  age 5    Rt lazy eye repair     EYE SURGERY  age 15    Rt eye     ORTHOPEDIC SURGERY      Lt leg 5X       Social History     Tobacco Use     Smoking status: Former Smoker     Packs/day: 0.50     Years: 10.00     Pack years: 5.00     Types: Cigarettes     Start date: 1988     Quit date: 2/3/2013     Years since quittin.6     Smokeless tobacco: Never Used   Substance Use Topics     Alcohol use: Yes     Comment: occ     Family History   Family history unknown: Yes         Current Outpatient Medications   Medication Sig Dispense Refill     PARoxetine (PAXIL) 10 MG tablet TAKE 2  TABLETS(20 MG) BY MOUTH EVERY MORNING 180 tablet 1     TROKENDI  MG 24 hr capsule Take 100 mg by mouth daily       Allergies   Allergen Reactions     Gabapentin Other (See Comments)     Irritability     Ibuprofen Other (See Comments)     LW Reaction: stomach pain       Breast Cancer Screening:    Breast CA Risk Assessment (FHS-7) 8/27/2021   Do you have a family history of breast, colon, or ovarian cancer? No / Unknown         Mammogram Screening: Recommended annual mammography  Pertinent mammograms are reviewed under the imaging tab.    History of abnormal Pap smear: NO - age 30-65 PAP every 5 years with negative HPV co-testing recommended  PAP / HPV Latest Ref Rng & Units 11/16/2017 3/9/2016 3/6/2015   PAP (Historical) - NIL NIL NIL   HPV16 NEG:Negative Negative Negative -   HPV18 NEG:Negative Negative Positive(A) -   HRHPV NEG:Negative Negative Negative -     Reviewed and updated as needed this visit by clinical staff   Tobacco  Allergies  Meds  Problems  Med Hx  Surg Hx  Fam Hx  Soc   Hx          Reviewed and updated as needed this visit by Provider   Tobacco  Allergies  Meds  Problems  Med Hx  Surg Hx  Fam Hx             Past Medical History:   Diagnosis Date     Anxiety      Cervical high risk HPV (human papillomavirus) test positive 3/2015, 3/2016    type 18     JESUS (generalized anxiety disorder) 12/7/2016     Migraines      RSD (reflex sympathetic dystrophy)       Past Surgical History:   Procedure Laterality Date     APPENDECTOMY  2017     BACK SURGERY  2017     CHOLECYSTECTOMY       ENT SURGERY  age 15    tonsilectomy     EYE SURGERY  age 5    Rt lazy eye repair     EYE SURGERY  age 15    Rt eye     ORTHOPEDIC SURGERY      Lt leg 5X       Review of Systems   Constitutional: Negative for chills and fever.   HENT: Negative for congestion, ear pain, hearing loss and sore throat.    Eyes: Negative for pain and visual disturbance.   Respiratory: Negative for cough and shortness of breath.   "  Cardiovascular: Negative for chest pain, palpitations and peripheral edema.   Gastrointestinal: Positive for diarrhea. Negative for abdominal pain, constipation, heartburn, hematochezia and nausea.   Breasts:  Negative for tenderness, breast mass and discharge.   Genitourinary: Negative for dysuria, frequency, genital sores, hematuria, pelvic pain, urgency, vaginal bleeding and vaginal discharge.   Musculoskeletal: Negative for arthralgias, joint swelling and myalgias.   Skin: Negative for rash.   Neurological: Negative for dizziness, weakness, headaches and paresthesias.   Psychiatric/Behavioral: Negative for mood changes. The patient is not nervous/anxious.         OBJECTIVE:   /84   Pulse 76   Temp 98.2  F (36.8  C) (Tympanic)   Resp 14   Ht 1.759 m (5' 9.25\")   Wt 93.9 kg (207 lb)   LMP 08/31/2022   SpO2 98%   BMI 30.35 kg/m    Physical Exam  GENERAL: healthy, alert and no distress  EYES: Eyes grossly normal to inspection, PERRL and conjunctivae and sclerae normal  HENT: ear canals and TM's normal, nose and mouth without ulcers or lesions  NECK: no adenopathy, no asymmetry, masses, or scars and thyroid normal to palpation  RESP: lungs clear to auscultation - no rales, rhonchi or wheezes  CV: regular rate and rhythm, normal S1 S2, no S3 or S4, no murmur, click or rub, no peripheral edema and peripheral pulses strong  ABDOMEN: soft, nontender, no hepatosplenomegaly, no masses and bowel sounds normal  MS: no gross musculoskeletal defects noted, no edema  SKIN: no suspicious lesions or rashes  NEURO: Normal strength and tone, mentation intact and speech normal  PSYCH: mentation appears normal, affect normal/bright  Females exam deferred at patient request.     Diagnostic Test Results:  Labs reviewed in Epic    ASSESSMENT/PLAN:       ICD-10-CM    1. Routine general medical examination at a health care facility  Z00.00    2. JESUS (generalized anxiety disorder)  F41.1 PARoxetine (PAXIL) 10 MG tablet     " "OFFICE/OUTPT VISIT,EST,LEVL III   3. Screening for malignant neoplasm of the rectum  Z12.12 Colonoscopy Screening  Referral   4. Encounter for screening mammogram for breast cancer  Z12.31 *MA Screening Digital Bilateral    medical conditions are stable. meds refilled.  Work on Healthy diet and exercise. Getting heart rate elevated for 30 mins most days of week.  Labs reviewed. Recheck yearlys.       COUNSELING:  Reviewed preventive health counseling, as reflected in patient instructions       Regular exercise       Healthy diet/nutrition       Vision screening    Estimated body mass index is 30.35 kg/m  as calculated from the following:    Height as of this encounter: 1.759 m (5' 9.25\").    Weight as of this encounter: 93.9 kg (207 lb).    Weight management plan: Discussed healthy diet and exercise guidelines    She reports that she quit smoking about 9 years ago. Her smoking use included cigarettes. She started smoking about 34 years ago. She has a 5.00 pack-year smoking history. She has never used smokeless tobacco.      Counseling Resources:  ATP IV Guidelines  Pooled Cohorts Equation Calculator  Breast Cancer Risk Calculator  BRCA-Related Cancer Risk Assessment: FHS-7 Tool  FRAX Risk Assessment  ICSI Preventive Guidelines  Dietary Guidelines for Americans, 2010  USDA's MyPlate  ASA Prophylaxis  Lung CA Screening    KIRAN Damon [facility]  "

## 2022-09-23 ENCOUNTER — TELEPHONE (OUTPATIENT)
Dept: GASTROENTEROLOGY | Facility: CLINIC | Age: 52
End: 2022-09-23

## 2022-09-23 NOTE — TELEPHONE ENCOUNTER
Screening Questions  BLUE  KIND OF PREP RED  LOCATION [review exclusion criteria] GREEN  SEDATION TYPE        Y Are you active on mychart?       Jt Hollis PA-C in AN FAMILY PRACTICE Ordering/Referring Provider?        [facility] What type of coverage do you have?      N Have you had a positive covid test in the last 90 days?     1. 30.6 BMI  [BMI 40+ - review exclusion criteria]    2. Y  Are you able to give consent for your medical care? [IF NO,RN REVIEW]        3. N  Are you taking any prescription pain medications on a routine schedule?        3a. N EXTENDED PREP What kind of prescription?   4. N Do you have any chemical dependencies such as alcohol, street drugs, or methadone?    5. N Do you have any history of post-traumatic stress syndrome, severe anxiety or history of psychosis?      **If yes 3- 5 , please schedule with MAC sedation.**          IF YES TO ANY 6 - 10 - HOSPITAL SETTING ONLY.     6.   N Do you need assistance transferring?     7.   N Have you had a heart or lung transplant?    8.   N Are you currently on dialysis?   9.   N Do you use daily home oxygen?   10. N Do you take nitroglycerin?   10a. N If yes, how often?     11. [FEMALES]  N Are you currently pregnant?    11a. N If yes, how many weeks? [ Greater than 12 weeks, OR NEEDED]    12. N Do you have Pulmonary Hypertension? *NEED PAC APPT AT [facility]*     13. N [review exclusion criteria]  Do you have any implantable devices in your body (pacemaker, defib, LVAD)?    14. N In the past 6 months, have you had any heart related issues including cardiomyopathy or heart attack?     14a. N If yes, did it require cardiac stenting if so when?     15. N Have you had a stroke or Transient ischemic attack (TIA - aka  mini stroke ) within 6 months?      16. N Do you have mod to severe Obstructive Sleep Apnea?  [Hospital only - Ok at [facility]]    17. N Do you have SEVERE AND UNCONTROLLED asthma? *NEED PAC APPT AT [facility]*     18. N Are you currently  "taking any blood thinners?     18a. If yes, inform patient to \"follow up w/ ordering provider for bridging instructions.\"    19. N Do you take the medication Phentermine?    19a. If yes, \"Hold for 7 days before procedure.  Please consult your prescribing provider if you have questions about holding this medication.\"     20. N  Do you have chronic kidney disease?      21. N  Do you have a diagnosis of diabetes?     22. N  On a regular basis do you go 3-5 days between bowel movements?     23.  Preferred LOCAL Pharmacy for Pre Prescription    [ LIST ONLY ONE PHARMACY]        [id number] - [place], [place] - [address] [place] [address] AT [facility]      - CLOSING REMINDERS -    Informed patient they will need an adult    Cannot take any type of public or medical transportation alone    Conscious Sedation- Needs  for 6 hours after the procedure       MAC/General-Needs  for 24 hours after procedure    Pre-Procedure Covid test to be completed [[facility] PCR Testing Required]    Confirmed Nurse will call to complete assessment       - SCHEDULING DETAILS -       Surgeon      Date of Procedure  Lower Endoscopy [Colonoscopy]  Type of Procedure Scheduled   MG per pt request  Location  [facility] PREP-If you answer yes to questions #8, #20, #21Which Colonoscopy Prep was Sent?     CS Sedation Type     n PAC / Pre-op Required       Additional comments:  Pt declined to schedule d/t covid protocol. \"Not messing with any of that\"  Deferred for 6-months to see if covid protocol has changed/ended.   "

## 2022-10-03 ENCOUNTER — ANCILLARY PROCEDURE (OUTPATIENT)
Dept: MAMMOGRAPHY | Facility: CLINIC | Age: 52
End: 2022-10-03
Attending: PHYSICIAN ASSISTANT
Payer: COMMERCIAL

## 2022-10-03 DIAGNOSIS — Z12.31 ENCOUNTER FOR SCREENING MAMMOGRAM FOR BREAST CANCER: ICD-10-CM

## 2022-10-03 PROCEDURE — 77067 SCR MAMMO BI INCL CAD: CPT | Mod: TC | Performed by: RADIOLOGY

## 2022-10-03 PROCEDURE — 77063 BREAST TOMOSYNTHESIS BI: CPT | Mod: TC | Performed by: RADIOLOGY

## 2022-10-15 ENCOUNTER — HEALTH MAINTENANCE LETTER (OUTPATIENT)
Age: 52
End: 2022-10-15

## 2023-04-12 ENCOUNTER — TELEPHONE (OUTPATIENT)
Dept: FAMILY MEDICINE | Facility: CLINIC | Age: 53
End: 2023-04-12
Payer: COMMERCIAL

## 2023-04-12 NOTE — TELEPHONE ENCOUNTER
Patient Quality Outreach    Patient is due for the following:   Colon Cancer Screening  Cervical Cancer Screening - PAP Needed    Next Steps:   Schedule a office visit for Pap    Type of outreach:    Sent [employer] message.    Next Steps:  Reach out within 90 days via [employer].    Max number of attempts reached: No. Will try again in 90 days if patient still on fail list.    Questions for provider review:    None     Ashleigh Parson MA

## 2023-08-23 ENCOUNTER — PATIENT OUTREACH (OUTPATIENT)
Dept: CARE COORDINATION | Facility: CLINIC | Age: 53
End: 2023-08-23
Payer: COMMERCIAL

## 2023-09-05 ENCOUNTER — PATIENT OUTREACH (OUTPATIENT)
Dept: CARE COORDINATION | Facility: CLINIC | Age: 53
End: 2023-09-05
Payer: COMMERCIAL

## 2023-09-06 ENCOUNTER — PATIENT OUTREACH (OUTPATIENT)
Dept: CARE COORDINATION | Facility: CLINIC | Age: 53
End: 2023-09-06
Payer: COMMERCIAL

## 2023-09-06 DIAGNOSIS — F41.1 GAD (GENERALIZED ANXIETY DISORDER): ICD-10-CM

## 2023-09-06 RX ORDER — PAROXETINE 10 MG/1
TABLET, FILM COATED ORAL
Qty: 180 TABLET | Refills: 0 | Status: SHIPPED | OUTPATIENT
Start: 2023-09-06

## 2023-10-29 ENCOUNTER — HEALTH MAINTENANCE LETTER (OUTPATIENT)
Age: 53
End: 2023-10-29

## 2024-01-07 ENCOUNTER — HEALTH MAINTENANCE LETTER (OUTPATIENT)
Age: 54
End: 2024-01-07

## 2024-12-21 ENCOUNTER — HEALTH MAINTENANCE LETTER (OUTPATIENT)
Age: 54
End: 2024-12-21